# Patient Record
Sex: MALE | Race: WHITE | HISPANIC OR LATINO | Employment: UNEMPLOYED | URBAN - METROPOLITAN AREA
[De-identification: names, ages, dates, MRNs, and addresses within clinical notes are randomized per-mention and may not be internally consistent; named-entity substitution may affect disease eponyms.]

---

## 2022-07-21 ENCOUNTER — EVALUATION (OUTPATIENT)
Dept: PHYSICAL THERAPY | Facility: CLINIC | Age: 47
End: 2022-07-21
Payer: COMMERCIAL

## 2022-07-21 DIAGNOSIS — M54.50 ACUTE MIDLINE LOW BACK PAIN WITHOUT SCIATICA: Primary | ICD-10-CM

## 2022-07-21 PROCEDURE — 97110 THERAPEUTIC EXERCISES: CPT | Performed by: PHYSICAL THERAPIST

## 2022-07-21 PROCEDURE — 97162 PT EVAL MOD COMPLEX 30 MIN: CPT | Performed by: PHYSICAL THERAPIST

## 2022-07-21 NOTE — PROGRESS NOTES
PT Evaluation     Today's date: 2022  Patient name: Jc Vasquez  : 1975  MRN: [id number]  Referring provider: Thelma York  Dx:   Encounter Diagnosis     ICD-10-CM    1  Acute midline low back pain without sciatica  M54 50        Start Time: 903  Stop Time: 955  Total time in clinic (min): 52 minutes    Assessment  Assessment details: Patient is a 42 y/o male with chief complaints of lumbar pain that began following a fall down the stairs on 2022  Patient presents with decreased functional mobility due to increased pain, decreased core and LE strength, decreased lumbar ROM, gait deviations  No response to repeated extension ROM  Patient also has a history of left ankle ORIF and was referred for his left ankle, as well  However, recommend patient see ortho for his ankle prior to treatment  Xray reports and medication list unavailable  Requested patient to bring these items to treatment next visit  Patient will benefit from skilled physical therapy to address impairment and improve functional mobility of the lumbar spine  PT needed to allow for return to maximal function and improve quality of life  Impairments: abnormal or restricted ROM, activity intolerance, impaired physical strength, lacks appropriate home exercise program and pain with function  Understanding of Dx/Px/POC: good   Prognosis: good    Goals  STG within 4 weeks:   1  Patient to be independent in HEP  2  Reduce pain by 50% to improve quality of life  3  Improve lumbar ROM to no more than minimal impairment  4  Improve hip strength to 4+ in all planes  LTG within 8 weeks:   1  Patient to be independent in ADLs/IADLs without difficulty  2  Patient to be able to sit for 30 minutes with minimal low back pain  3  Patient to be able to ambulate community distances with minimal low back pain       Plan  Patient would benefit from: skilled physical therapy and PT eval  Planned modality interventions: cryotherapy, hydrotherapy and unattended electrical stimulation  Planned therapy interventions: therapeutic training, therapeutic exercise, therapeutic activities, stretching, strengthening, postural training, patient education, neuromuscular re-education, manual therapy, joint mobilization, IADL retraining, activity modification, ADL retraining, ADL training, body mechanics training, flexibility, functional ROM exercises, gait training, graded activity, graded exercise, graded motor, home exercise program, abdominal trunk stabilization and balance/weight bearing training  Frequency: 2x week  Duration in weeks: 8  Plan of Care beginning date: 2022  Plan of Care expiration date: 2022  Treatment plan discussed with: patient        Subjective Evaluation    History of Present Illness  Mechanism of injury: Patient is a 42 y/o male with chief complaints of low back pain  He states he had a fall down the stairs on 2022  He was brought to hospital by ambulance and states all imaging was negative for fracture  Prior to his fall he denies having back pain, but does report he was recovering from a calcaneous fracture and ORIF  Since his fall, his left ankle is bothering him worse  He also states since his April fall, his left knee and outside of his leg is bothering him more  He is referred for evaluation and treatment     Pain  Current pain ratin  At best pain ratin  At worst pain ratin  Location: low back   Quality: throbbing  Relieving factors: heat  Progression: no change    Social Support  Steps to enter house: no  Stairs in house: no   Lives in: [facility]    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[employer] )  Hand dominance: right  Exercise history: none       Diagnostic Tests  Abnormal x-ray: reports unavailable; requested patient bring copies of xray reports for lumbar spine and left ankle   Treatments  Treatments tried: left ankle surgery   Patient Goals  Patient goals for therapy: return to work, return to sport/leisure activities, independence with ADLs/IADLs and decreased pain          Objective     Concurrent Complaints  Negative for bladder dysfunction, bowel dysfunction and saddle (S4) numbness    Neurological Testing     Sensation     Lumbar   Left   Intact: light touch    Right   Intact: light touch    Reflexes   Left   Patellar (L4): normal (2+)  Achilles (S1): normal (2+)    Right   Patellar (L4): normal (2+)  Achilles (S1): normal (2+)    Active Range of Motion     Lumbar   Flexion:  with pain Restriction level: moderate  Extension:  with pain Restriction level: moderate  Left lateral flexion:  with pain Restriction level: moderate  Right lateral flexion:  with pain Restriction level: moderate  Mechanical Assessment    Cervical      Thoracic      Lumbar    Lying extension: repeated movements  Pain location: no change    Strength/Myotome Testing     Left Hip   Planes of Motion   Flexion: 3+  Extension: 3+    Right Hip   Planes of Motion   Flexion: 4+  Extension: 3+    Left Knee   Flexion: 3+  Extension: 3+    Right Knee   Flexion: 4+  Extension: 4+    Left Ankle/Foot   Dorsiflexion: 4-  Great toe extension: 4-    Right Ankle/Foot   Dorsiflexion: 4+  Great toe extension: 4+    General Comments:      Lumbar Comments  Pain with palpation of lumbar SPs; no radiating symptoms with palpation                Precautions: left ankle ORIF Sept 2021; fall down stairs April 2022     Increased time spent on patient education with diagnosis, prognosis, goals of therapy, progression of therapy, and plan of care  All questions answered  Patient instructed to call clinic with questions or concerns  Written HEP provided to patient  [employer]  Access Code: [email or web address]      Manuals 7/21                                                                 Neuro Re-Ed             TA engagement 3"x20             Adductor set  3"x20 Prone glute set  3"x20                                                                 Ther Ex             Pt Edu  KS                                                                                                        Ther Activity                                       Gait Training                                       Modalities

## 2022-07-21 NOTE — LETTER
2022    Leigh Sheth  [address]    Patient: Shayna Mart   YOB: 1975   Date of Visit: 2022     Encounter Diagnosis     ICD-10-CM    1  Acute midline low back pain without sciatica  M54 50        Dear Dr Michelle Currie Recipients: Thank you for your recent referral of Shayna Mart  Please review the attached evaluation summary from Yasin's recent visit  Please verify that you agree with the plan of care by signing the attached order  If you have any questions or concerns, please do not hesitate to call  I sincerely appreciate the opportunity to share in the care of one of your patients and hope to have another opportunity to work with you in the near future  Sincerely,    Krystina Morrison, PT      Referring Provider:      I certify that I have read the below Plan of Care and certify the need for these services furnished under this plan of treatment while under my care  Leigh Sheth  [address] [facility] [place] [address]  Via Fax: [phone]          PT Evaluation     Today's date: 2022  Patient name: Shayna Mart  : 1975  MRN: [id number]  Referring provider: Debbie Shay  Dx:   Encounter Diagnosis     ICD-10-CM    1  Acute midline low back pain without sciatica  M54 50        Start Time: 903  Stop Time: 955  Total time in clinic (min): 52 minutes    Assessment  Assessment details: Patient is a 42 y/o male with chief complaints of lumbar pain that began following a fall down the stairs on 2022  Patient presents with decreased functional mobility due to increased pain, decreased core and LE strength, decreased lumbar ROM, gait deviations  No response to repeated extension ROM  Patient also has a history of left ankle ORIF and was referred for his left ankle, as well  However, recommend patient see ortho for his ankle prior to treatment  Xray reports and medication list unavailable    Requested patient to bring these items to treatment next visit  Patient will benefit from skilled physical therapy to address impairment and improve functional mobility of the lumbar spine  PT needed to allow for return to maximal function and improve quality of life  Impairments: abnormal or restricted ROM, activity intolerance, impaired physical strength, lacks appropriate home exercise program and pain with function  Understanding of Dx/Px/POC: good   Prognosis: good    Goals  STG within 4 weeks:   1  Patient to be independent in HEP  2  Reduce pain by 50% to improve quality of life  3  Improve lumbar ROM to no more than minimal impairment  4  Improve hip strength to 4+ in all planes  LTG within 8 weeks:   1  Patient to be independent in ADLs/IADLs without difficulty  2  Patient to be able to sit for 30 minutes with minimal low back pain  3  Patient to be able to ambulate community distances with minimal low back pain  Plan  Patient would benefit from: skilled physical therapy and PT eval  Planned modality interventions: cryotherapy, hydrotherapy and unattended electrical stimulation  Planned therapy interventions: therapeutic training, therapeutic exercise, therapeutic activities, stretching, strengthening, postural training, patient education, neuromuscular re-education, manual therapy, joint mobilization, IADL retraining, activity modification, ADL retraining, ADL training, body mechanics training, flexibility, functional ROM exercises, gait training, graded activity, graded exercise, graded motor, home exercise program, abdominal trunk stabilization and balance/weight bearing training  Frequency: 2x week  Duration in weeks: 8  Plan of Care beginning date: 7/21/2022  Plan of Care expiration date: 9/22/2022  Treatment plan discussed with: patient        Subjective Evaluation    History of Present Illness  Mechanism of injury: Patient is a 42 y/o male with chief complaints of low back pain    He states he had a fall down the stairs on 2022  He was brought to hospital by ambulance and states all imaging was negative for fracture  Prior to his fall he denies having back pain, but does report he was recovering from a calcaneous fracture and ORIF  Since his fall, his left ankle is bothering him worse  He also states since his April fall, his left knee and outside of his leg is bothering him more  He is referred for evaluation and treatment     Pain  Current pain ratin  At best pain ratin  At worst pain ratin  Location: low back   Quality: throbbing  Relieving factors: heat  Progression: no change    Social Support  Steps to enter house: no  Stairs in house: no   L[facility] in: [facility]    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[employer] )  Hand dominance: right  Exercise history: none       Diagnostic Tests  Abnormal x-ray: reports unavailable; requested patient bring copies of xray reports for lumbar spine and left ankle   Treatments  Treatments tried: left ankle surgery   Patient Goals  Patient goals for therapy: return to work, return to [employer] activities, independence with ADLs/IADLs and decreased pain          Objective     Concurrent Complaints  Negative for bladder dysfunction, bowel dysfunction and saddle (S4) numbness    Neurological Testing     Sensation     Lumbar   Left   Intact: light touch    Right   Intact: light touch    Reflexes   Left   Patellar (L4): normal (2+)  Achilles (S1): normal (2+)    Right   Patellar (L4): normal (2+)  Achilles (S1): normal (2+)    Active Range of Motion     Lumbar   Flexion:  with pain Restriction level: moderate  Extension:  with pain Restriction level: moderate  Left lateral flexion:  with pain Restriction level: moderate  Right lateral flexion:  with pain Restriction level: moderate  Mechanical Assessment    Cervical      Thoracic      Lumbar    Lying extension: repeated movements  Pain location: no change    Strength/Myotome Testing     Left Hip   Planes of Motion   Flexion: 3+  Extension: 3+    Right Hip   Planes of Motion   Flexion: 4+  Extension: 3+    Left Knee   Flexion: 3+  Extension: 3+    Right Knee   Flexion: 4+  Extension: 4+    Left Ankle/Foot   Dorsiflexion: 4-  Great toe extension: 4-    Right Ankle/Foot   Dorsiflexion: 4+  Great toe extension: 4+    General Comments:      Lumbar Comments  Pain with palpation of lumbar SPs; no radiating symptoms with palpation                Precautions: left ankle ORIF Sept 2021; fall down stairs April 2022     Increased time spent on patient education with diagnosis, prognosis, goals of therapy, progression of therapy, and plan of care  All questions answered  Patient instructed to call clinic with questions or concerns  Written HEP provided to patient  [employer]  Access Code: [email or web address]      Manuals 7/21                                                                 Neuro Re-Ed             TA engagement 3"x20             Adductor set  3"x20             Prone glute set  3"x20                                                                 Ther Ex             Pt Edu  KS                                                                                                        Ther Activity                                       Gait Training                                       Modalities

## 2022-07-25 ENCOUNTER — OFFICE VISIT (OUTPATIENT)
Dept: PHYSICAL THERAPY | Facility: CLINIC | Age: 47
End: 2022-07-25
Payer: COMMERCIAL

## 2022-07-25 DIAGNOSIS — M54.50 ACUTE MIDLINE LOW BACK PAIN WITHOUT SCIATICA: Primary | ICD-10-CM

## 2022-07-25 PROCEDURE — 97140 MANUAL THERAPY 1/> REGIONS: CPT | Performed by: PHYSICAL THERAPIST

## 2022-07-25 PROCEDURE — 97110 THERAPEUTIC EXERCISES: CPT | Performed by: PHYSICAL THERAPIST

## 2022-07-25 PROCEDURE — 97014 ELECTRIC STIMULATION THERAPY: CPT | Performed by: PHYSICAL THERAPIST

## 2022-07-25 RX ORDER — LIDOCAINE 50 MG/G
1 PATCH TOPICAL DAILY
COMMUNITY
End: 2022-10-18

## 2022-07-25 RX ORDER — DICLOFENAC SODIUM 75 MG/1
TABLET, DELAYED RELEASE ORAL 2 TIMES DAILY
COMMUNITY
End: 2022-10-18

## 2022-07-25 RX ORDER — METHOCARBAMOL 750 MG/1
750 TABLET, FILM COATED ORAL EVERY 6 HOURS PRN
COMMUNITY
End: 2022-10-18

## 2022-07-25 NOTE — PROGRESS NOTES
Daily Note     Today's date: 2022  Patient name: Aleksandr Perez  : 1975  MRN: [id number]  Referring provider: Bridget Villanueva  Dx:   Encounter Diagnosis     ICD-10-CM    1  Acute midline low back pain without sciatica  M54 50                   Subjective: Patient reports 8/10 pain within lumbar region  Objective: See treatment diary below      Assessment: Tolerated treatment fair  Reduction of pain from 8 to 6 post session  Review of several core stab exercises  Xray report available on patient's phone from 22 shows:" minimal L5-S1 disc space narrowing  No fracture "  Patient would benefit from continued PT      Plan: Continue per plan of care  Precautions: left ankle ORIF 2021; fall down stairs 2022       [employer]  Access Code: [email or web address]      Manuals             IASTM- LSP   10'                                                   Neuro Re-Ed             TA engagement 3"x20  3"x20            Adductor set  3"x20  3"x20            Prone glute set  3"x20  3"x20           hooklying hip abd  Red x20                                                   Ther Ex             Pt Edu  KS  KS           Recumbent bike   5 min            Prone lying   10 min w heat                                                                             Ther Activity                                       Gait Training                                       Modalities             Hot pack- LSP  10'            TENS LSP   10'

## 2022-07-28 ENCOUNTER — OFFICE VISIT (OUTPATIENT)
Dept: PHYSICAL THERAPY | Facility: CLINIC | Age: 47
End: 2022-07-28
Payer: COMMERCIAL

## 2022-07-28 DIAGNOSIS — M54.50 ACUTE MIDLINE LOW BACK PAIN WITHOUT SCIATICA: Primary | ICD-10-CM

## 2022-07-28 PROCEDURE — 97014 ELECTRIC STIMULATION THERAPY: CPT | Performed by: PHYSICAL THERAPIST

## 2022-07-28 PROCEDURE — 97112 NEUROMUSCULAR REEDUCATION: CPT | Performed by: PHYSICAL THERAPIST

## 2022-07-28 PROCEDURE — 97110 THERAPEUTIC EXERCISES: CPT | Performed by: PHYSICAL THERAPIST

## 2022-07-28 PROCEDURE — 97140 MANUAL THERAPY 1/> REGIONS: CPT | Performed by: PHYSICAL THERAPIST

## 2022-07-28 NOTE — PROGRESS NOTES
Daily Note     Today's date: 2022  Patient name: Ranjana Esparza  : 1975  MRN: [id number]  Referring provider: Verner Beverage  Dx:   Encounter Diagnosis     ICD-10-CM    1  Acute midline low back pain without sciatica  M54 50                   Subjective: Patient states his back is still bothering him, without much relief  Objective: See treatment diary below      Assessment: Tolerated treatment well  No change in symptoms with prone press up or bilateral side glides  However, reduction in symptoms with prone lumbar extension mobs  Will re-assess next visit  Patient would benefit from continued PT      Plan: Continue per plan of care  Precautions: left ankle ORIF 2021; fall down stairs 2022       [employer]  Access Code: [email or web address]      Manuals            IASTM- LSP   10'            Prone lumbar extension mobs    10'                                     Neuro Re-Ed             TA engagement 3"x20  3"x20  3"x20           TA w DLS PB push    8"R92           Adductor set  3"x20  3"x20            Prone glute set  3"x20  3"x20           hooklying hip abd  Red x20  Red x20           Paloff press    NV                                     Ther Ex             Pt Edu  KS  KS KS          Recumbent bike   5 min  7 min           Prone lying   10 min w heat            Repeated motion re-assessment    10 min                                                               Ther Activity                                       Gait Training                                       Modalities             Hot pack- LSP  10'  10'           TENS LSP   10' 10'

## 2022-08-01 ENCOUNTER — OFFICE VISIT (OUTPATIENT)
Dept: PHYSICAL THERAPY | Facility: CLINIC | Age: 47
End: 2022-08-01
Payer: COMMERCIAL

## 2022-08-01 DIAGNOSIS — M54.50 ACUTE MIDLINE LOW BACK PAIN WITHOUT SCIATICA: Primary | ICD-10-CM

## 2022-08-01 PROCEDURE — 97140 MANUAL THERAPY 1/> REGIONS: CPT | Performed by: PHYSICAL THERAPIST

## 2022-08-01 PROCEDURE — 97112 NEUROMUSCULAR REEDUCATION: CPT | Performed by: PHYSICAL THERAPIST

## 2022-08-01 NOTE — PROGRESS NOTES
Daily Note     Today's date: 2022  Patient name: Clement Matta  : 1975  MRN: [id number]  Referring provider: Andrea Frost  Dx:   Encounter Diagnosis     ICD-10-CM    1  Acute midline low back pain without sciatica  M54 50                   Subjective: Patient reports 710 pain  States it was a little better following last visit, but the relief didn't last         Objective: See treatment diary below      Assessment: Tolerated treatment well  Reduction in pain from start to finish of session to a 6/10 pain  Time constraint due to patient having to leave early; will resume exercises at next visit  Patient would benefit from continued PT      Plan: Continue per plan of care  Precautions: left ankle ORIF 2021; fall down stairs 2022       [employer]  Access Code: [email or web address]      Manuals           IASTM- LSP   10'            Prone lumbar extension mobs    10'  10'                                   Neuro Re-Ed             TA engagement 3"x20  3"x20  3"x20           TA w DLS PB push    7"M94           Adductor set  3"x20  3"x20            Prone glute set  3"x20  3"x20           hooklying hip abd  Red x20  Red x20           Paloff press    NV  Red x 20 ea          Prone hip extension     2x10                       Ther Ex             Pt Edu  KS  KS KS KS          Recumbent bike   5 min  7 min  7 min         Prone lying   10 min w heat            Repeated motion re-assessment    10 min                                                               Ther Activity                                       Gait Training                                       Modalities             Hot pack- LSP  10'  10'           TENS LSP   10' 10'

## 2022-08-04 ENCOUNTER — OFFICE VISIT (OUTPATIENT)
Dept: PHYSICAL THERAPY | Facility: CLINIC | Age: 47
End: 2022-08-04
Payer: COMMERCIAL

## 2022-08-04 DIAGNOSIS — M54.50 ACUTE MIDLINE LOW BACK PAIN WITHOUT SCIATICA: Primary | ICD-10-CM

## 2022-08-04 PROCEDURE — 97110 THERAPEUTIC EXERCISES: CPT | Performed by: PHYSICAL THERAPIST

## 2022-08-04 PROCEDURE — 97140 MANUAL THERAPY 1/> REGIONS: CPT | Performed by: PHYSICAL THERAPIST

## 2022-08-04 PROCEDURE — 97014 ELECTRIC STIMULATION THERAPY: CPT | Performed by: PHYSICAL THERAPIST

## 2022-08-04 NOTE — PROGRESS NOTES
Daily Note     Today's date: 2022  Patient name: Enoch Izquierdo  : 1975  MRN: [id number]  Referring provider: Odalis Means  Dx:   Encounter Diagnosis     ICD-10-CM    1  Acute midline low back pain without sciatica  M54 50                   Subjective: Patient reports 7/10 pain  States the relief from therapy only lasts 45 minutes after therapy, partially due to sitting in the car and having to drive  Objective: See treatment diary below      Assessment: Tolerated treatment well  No reduction in symptoms with prone press up  However reduction noted with lumbar   He reports a 4/10 pain post session; continued good response to heat/stim  Addition of prone quad/hip flexor stretch  Patient would benefit from continued PT      Plan: Continue per plan of care  Precautions: left ankle ORIF 2021; fall down stairs 2022       [employer]  Access Code: [email or web address]      Manuals         IASTM- LSP   10'            Prone lumbar extension mobs    10'  10' 10'                                   Neuro Re-Ed             TA engagement 3"x20  3"x20  3"x20           TA w DLS PB push    0"R66           Adductor set  3"x20  3"x20            Prone glute set  3"x20  3"x20           hooklying hip abd  Red x20  Red x20           Paloff press    NV  Red x 20 ea  Red x20 ea         Prone hip extension     2x10                       Ther Ex             Pt Edu  KS  KS KS KS  KS        Recumbent bike   5 min  7 min  7 min 10 min        Prone lying   10 min w heat            Repeated motion re-assessment    10 min   Prone press up 2x10         Standing hip abd/ext     Red 2x10 ea         Prone quad/hip flexor stretch     5x20" ea                                  Ther Activity                                       Gait Training                                       Modalities             Hot pack- LSP  10'  10'   10'        TENS LSP   10' 10'   10'

## 2022-08-08 ENCOUNTER — APPOINTMENT (OUTPATIENT)
Dept: PHYSICAL THERAPY | Facility: CLINIC | Age: 47
End: 2022-08-08

## 2022-08-11 ENCOUNTER — OFFICE VISIT (OUTPATIENT)
Dept: PHYSICAL THERAPY | Facility: CLINIC | Age: 47
End: 2022-08-11
Payer: COMMERCIAL

## 2022-08-11 DIAGNOSIS — M54.50 ACUTE MIDLINE LOW BACK PAIN WITHOUT SCIATICA: Primary | ICD-10-CM

## 2022-08-11 PROCEDURE — 97014 ELECTRIC STIMULATION THERAPY: CPT | Performed by: PHYSICAL THERAPIST

## 2022-08-11 PROCEDURE — 97110 THERAPEUTIC EXERCISES: CPT | Performed by: PHYSICAL THERAPIST

## 2022-08-11 NOTE — PROGRESS NOTES
Daily Note     Today's date: 2022  Patient name: Aleksandr Perez  : 1975  MRN: [id number]  Referring provider: Bridget Villanueva  Dx:   Encounter Diagnosis     ICD-10-CM    1  Acute midline low back pain without sciatica  M54 50                   Subjective: Patient states his back pain is still persistent  He reports 7/10 pain  States he had to reschedule his appointment for his foot/ankle for 22  Objective: See treatment diary below      Assessment: Tolerated treatment fair  At this time, due to persistent pain, recommend he reach out to PCP/referring physician to get a referral to pain management  Plan to go ON HOLD until he sees pain management or physician for additional treatment  Patient instructed to continue with HEP and call clinic with any questions or concerns  Plan: Patient on hold until he sees pain management or physician for additional treatment/referral       Precautions: left ankle ORIF 2021; fall down stairs 2022       [employer]  Access Code: [email or web address]      Manuals        IASTM- LSP   10'            Prone lumbar extension mobs    10'  10' 10'  decline                                 Neuro Re-Ed             TA engagement 3"x20  3"x20  3"x20           TA w DLS PB push    8"O10           Adductor set  3"x20  3"x20            Prone glute set  3"x20  3"x20           hooklying hip abd  Red x20  Red x20           Paloff press    NV  Red x 20 ea  Red x20 ea  Green x 20 ea        Prone hip extension     2x10                       Ther Ex             Pt Edu  KS  KS KS KS  KS KS       Recumbent bike   5 min  7 min  7 min 10 min 8 min       Prone lying   10 min w heat            Repeated motion re-assessment    10 min   Prone press up 2x10         Standing hip abd/ext     Red 2x10 ea  Red 2x10 ea       Prone quad/hip flexor stretch     5x20" ea        Seated lumbar flexion w PB- L, C, R      5"x5 ea Ther Activity                                       Gait Training                                       Modalities             Hot pack- LSP  10'  10'   10' 10'       TENS LSP   10' 10'   10'  10'

## 2022-08-15 ENCOUNTER — APPOINTMENT (OUTPATIENT)
Dept: PHYSICAL THERAPY | Facility: CLINIC | Age: 47
End: 2022-08-15

## 2022-08-18 ENCOUNTER — APPOINTMENT (OUTPATIENT)
Dept: PHYSICAL THERAPY | Facility: CLINIC | Age: 47
End: 2022-08-18

## 2022-08-22 ENCOUNTER — APPOINTMENT (OUTPATIENT)
Dept: PHYSICAL THERAPY | Facility: CLINIC | Age: 47
End: 2022-08-22

## 2022-08-23 LAB — COLOGUARD RESULT REPORTABLE: NEGATIVE

## 2022-08-24 ENCOUNTER — APPOINTMENT (OUTPATIENT)
Dept: RADIOLOGY | Facility: AMBULARY SURGERY CENTER | Age: 47
End: 2022-08-24
Attending: ORTHOPAEDIC SURGERY
Payer: COMMERCIAL

## 2022-08-24 VITALS
DIASTOLIC BLOOD PRESSURE: 84 MMHG | WEIGHT: 240 LBS | SYSTOLIC BLOOD PRESSURE: 147 MMHG | BODY MASS INDEX: 35.55 KG/M2 | HEART RATE: 76 BPM | HEIGHT: 69 IN

## 2022-08-24 DIAGNOSIS — M25.572 LEFT ANKLE PAIN, UNSPECIFIED CHRONICITY: ICD-10-CM

## 2022-08-24 DIAGNOSIS — M19.072 ARTHRITIS OF LEFT SUBTALAR JOINT: Primary | ICD-10-CM

## 2022-08-24 PROCEDURE — 99243 OFF/OP CNSLTJ NEW/EST LOW 30: CPT | Performed by: ORTHOPAEDIC SURGERY

## 2022-08-24 PROCEDURE — 20600 DRAIN/INJ JOINT/BURSA W/O US: CPT | Performed by: ORTHOPAEDIC SURGERY

## 2022-08-24 PROCEDURE — 73610 X-RAY EXAM OF ANKLE: CPT

## 2022-08-24 RX ORDER — OLANZAPINE 2.5 MG/1
2.5 TABLET ORAL
COMMUNITY
Start: 2022-06-13

## 2022-08-24 RX ORDER — BUPRENORPHINE AND NALOXONE 2; .5 MG/1; MG/1
FILM, SOLUBLE BUCCAL; SUBLINGUAL
COMMUNITY
Start: 2022-06-14 | End: 2022-10-18

## 2022-08-24 RX ORDER — TRAZODONE HYDROCHLORIDE 100 MG/1
TABLET ORAL
COMMUNITY
Start: 2022-06-25

## 2022-08-24 RX ORDER — POLYETHYLENE GLYCOL 3350 17 G/17G
POWDER, FOR SOLUTION ORAL
COMMUNITY
Start: 2022-05-10

## 2022-08-24 RX ORDER — METHOCARBAMOL 500 MG/1
TABLET, FILM COATED ORAL
COMMUNITY
Start: 2022-05-17 | End: 2022-10-18

## 2022-08-24 RX ORDER — CELECOXIB 200 MG/1
200 CAPSULE ORAL DAILY
COMMUNITY
Start: 2022-05-10 | End: 2022-10-18

## 2022-08-24 RX ORDER — TRIAMCINOLONE ACETONIDE 40 MG/ML
40 INJECTION, SUSPENSION INTRA-ARTICULAR; INTRAMUSCULAR
Status: COMPLETED | OUTPATIENT
Start: 2022-08-24 | End: 2022-08-24

## 2022-08-24 RX ORDER — MELOXICAM 15 MG/1
15 TABLET ORAL DAILY
Qty: 30 TABLET | Refills: 1 | Status: SHIPPED | OUTPATIENT
Start: 2022-08-24

## 2022-08-24 RX ADMIN — TRIAMCINOLONE ACETONIDE 40 MG: 40 INJECTION, SUSPENSION INTRA-ARTICULAR; INTRAMUSCULAR at 16:09

## 2022-08-24 NOTE — PROGRESS NOTES
BILLY Barrientos  Attending, Orthopaedic Surgery  Foot and [facility] [address] [facility]      ORTHOPAEDIC FOOT AND ANKLE CLINIC VISIT     Assessment:     Encounter Diagnoses   Name Primary?  Left ankle pain, unspecified chronicity     Arthritis of left subtalar joint Yes            Plan:   · The patient verbalized understanding of exam findings and treatment plan  We engaged in the shared decision-making process and treatment options were discussed at length with the patient  Surgical and conservative management discussed today along with risks and benefits  · Greg Monday has subtalar arthritis secondary to his calcaneus fracture  · We discussed injection today and we completed it  Consented and understood risks/benefits  · Discussed surgical options including removal of hardware, subtalar fusion  He will let us know when he is ready to have surgery  · Return if symptoms worsen or fail to improve  Small joint arthrocentesis: L subtalar  Universal Protocol:  Consent: Verbal consent obtained  Risks and benefits: risks, benefits and alternatives were discussed  Consent given by: patient  Patient identity confirmed: verbally with patient    Supporting Documentation  Indications: pain   Procedure Details  Location: foot - L subtalar  Needle size: 22 G  Ultrasound guidance: no  Approach: lateral  Medications administered: 40 mg triamcinolone acetonide 40 mg/mL    Patient tolerance: patient tolerated the procedure well with no immediate complications          History of Present Illness:   Chief Complaint:   Chief Complaint   Patient presents with    Left Ankle - Pain     Shayna Mart is a 41 y o  male who is being seen for left ankle pain  He sustained a calcaneus fracture in 09/2021 and received an ORIF to fix it  Since the surgery he has complained of pain and stiffness  He was seen by podiatry in 12/2020 who discussed  a possible subtalar fusion and removal of hardware   His pain is localized at to his subtalar region with minimal radiating and described as sharp and severe  Patient denies numbness, tingling or radicular pain  Denies history of neuropathy  Patient does not smoke, does not have diabetes and does not take blood thinners  Patient denies family history of anesthesia complications and has not had any complications with anesthesia  Pain/symptom timing:  Worse during the day when active  Pain/symptom context:  Worse with activites and work  Pain/symptom modifying factors:  Rest makes better, activities make worse  Pain/symptom associated signs/symptoms: none    Prior treatment   · NSAIDsYes   · Injections Yes   · Bracing/Orthotics No    · Physical Therapy Yes         Orthopedic Surgical History:   See below    Past Medical, Surgical and Social History:  Past Medical History:  has no past medical history on file  Problem List: does not have a problem list on file  Past Surgical History:  has a past surgical history that includes Ankle fracture surgery (Left, 09/27/2021)  Family History: family history is not on file  Social History:  reports that he has quit smoking  He has never used smokeless tobacco  He reports current alcohol use  He reports that he does not use drugs  Current Medications: has a current medication list which includes the following prescription(s): diclofenac, meloxicam, buprenorphine-naloxone, celecoxib, lidocaine, methocarbamol, methocarbamol, olanzapine, polyethylene glycol, and trazodone  Allergies: has No Known Allergies       Review of Systems:  General- denies fever/chills  HEENT- denies hearing loss or sore throat  Eyes- denies eye pain or visual disturbances, denies red eyes  Respiratory- denies cough or SOB  Cardio- denies chest pain or palpitations  GI- denies abdominal pain  Endocrine- denies urinary frequency  Urinary- denies pain with urination  Musculoskeletal- Negative except noted above  Skin- denies rashes or wounds  Neurological- denies dizziness or headache  Psychiatric- denies anxiety or difficulty concentrating    Physical Exam:   /84 (BP Location: Right arm, Patient Position: Sitting, Cuff Size: Adult)   Pulse 76   Ht 5' 9" (1 753 m)   Wt 109 kg (240 lb)   BMI 35 44 kg/m²   General/Constitutional: No apparent distress: well-nourished and well developed  Eyes: normal ocular motion  Cardio: RRR, Normal S1S2, No m/r/g  Lymphatic: No appreciable lymphadenopathy  Respiratory: Non-labored breathing, CTA b/l no w/c/r  Vascular: No edema, swelling or tenderness, except as noted in detailed exam   Integumentary: No impressive skin lesions present, except as noted in detailed exam   Neuro: No ataxia or tremors noted  Psych: Normal mood and affect, oriented to person, place and time  Appropriate affect  Musculoskeletal: Normal, except as noted in detailed exam and in HPI  Examination    Left    Gait Normal and Antalgic   Musculoskeletal Tender to palpation at subtalar region    Skin Normal       Nails Normal    Range of Motion  10 degrees dorsiflexion, 30 degrees plantarflexion  Subtalar motion: limited & painful    Stability Stable    Muscle Strength 5/5 tibialis anterior  5/5 gastrocnemius-soleus  5/5 posterior tibialis  5/5 peroneal/eversion strength  5/5 EHL  5/5 FHL    Neurologic Normal    Sensation Intact to light touch throughout sural, saphenous, superficial peroneal, deep peroneal and medial/lateral plantar nerve distributions  [place] 5 07 filament (10g) testing deferred  Cardiovascular Brisk capillary refill < 2 seconds,intact DP and PT pulses    Special Tests None      Imaging Studies:   3 views of the ankle were taken, reviewed and interpreted independently that demonstrate Subtalar arthritis with s/p ORIF of left calcaneous  Reviewed by me personally      Scribe Attestation    I,:  Aditi Leo PA-C am acting as a scribe while in the presence of the attending physician :       I,:  Rox Padron MD personally performed the services described in this documentation    as scribed in my presence :             Angelina Cinnamon Lachman, MD  Foot & Ankle Surgery   Department of [address] [place] [address]      I personally performed the service  Angelina Cinnamon Lachman, MD

## 2022-08-25 ENCOUNTER — APPOINTMENT (OUTPATIENT)
Dept: PHYSICAL THERAPY | Facility: CLINIC | Age: 47
End: 2022-08-25

## 2022-08-25 NOTE — PROGRESS NOTES
Assessment:  1  Chronic bilateral low back pain with left-sided sciatica    2  Lumbar paraspinal muscle spasm    3  Strain of lumbar region, subsequent encounter        Plan:  Orders Placed This Encounter   Procedures    MRI lumbar spine without contrast     Standing Status:   Future     Standing Expiration Date:   8/29/2026     Scheduling Instructions: There is no preparation for this test  Please leave your jewelry and valuables at home, wedding rings are the exception  All patients will be required to change into a hospital gown and pants  Street clothes are not permitted in the MRI  Magnetic nail polish must be removed prior to arrival for your test  Please bring your insurance cards, a form of photo ID and a list of your medications with you  Arrive 15 minutes prior to your appointment time in order to register  Please bring any prior CT or MRI studies of this area that were not performed at a [facility]  To schedule this appointment, please contact Central Scheduling at [phone]  Prior to your appointment, please make sure you complete the MRI Screening Form when you e-Check in for your appointment  This will be available starting 7 days before your appointment in [address]  You may receive an e-mail with an activation code if you do not have a [employer] account  If you do not have access to a device, we will complete your screening at your appointment  Order Specific Question:   What is the patient's sedation requirement? Answer:   No Sedation     Order Specific Question:   Release to patient through [employer]     Answer:   Immediate     Order Specific Question:   Is order priority selected as STAT? Answer:   No     Order Specific Question:   Reason for Exam (FREE TEXT)     Answer:   Persistent low back and left lower extremity  PT held due to lack of improvement         New Medications Ordered This Visit   Medications    methocarbamol (ROBAXIN) 500 mg tablet Sig: TAKE 2 TABLETS BY MOUTH 3 TIMES A DAY FOR 14 DAYS    Naproxen Sodium 220 MG CAPS     Sig: Take 2 tablets by mouth    Diclofenac Sodium (VOLTAREN) 1 %     Sig: APPLY 2 GRAMS TO AREA OF PAIN 4 TIMES PER DAY AS NEEDED FOR PAIN    traZODone (DESYREL) 100 mg tablet     Sig: Take 100 mg by mouth daily       My impressions and treatment recommendations were discussed in detail with the patient, who verbalized understanding and had no further questions  This is a 51-year-old male who presents to our office with chief complaint of bilateral low back pain, left greater than right with radiation down the left lower extremity, down the posterior thigh, past the knee into the posterolateral calf  Issues began after fall in April 2022 with little relief from multiple over-the-counter analgesics including NSAIDs, muscle relaxers, oral and buccal opioids  He has also undergone multiple visits with physical therapy and treatment was halted due to lack of progression and worsening of symptoms  Differential diagnosis includes lumbar radiculopathy     Given lack of response to conservative therapies, will order MRI lumbar spine to assess for any significant compressive pathology  Discussed that he may be candidate for epidural steroid injection should imaging reveal significant compressive pathology      [facility] Prescription Drug Monitoring Program report was reviewed and was appropriate     Complete risks and benefits including bleeding, infection, tissue reaction, nerve injury and allergic reaction were discussed  The approach was demonstrated using models and literature was provided  Verbal and written consent was obtained  Discharge instructions were provided  I personally saw and examined the patient and I agree with the above discussed plan of care      History of Present Illness:    Dewayne Oliver is a 41 y o  male who presents to [facility] for initial evaluation of the above stated pain complaints  The patient has a past medical and chronic pain history as outlined in the assessment section  He was referred by Thais Connolly PT  No address on file   Patient is here with chief complaint of low back pain with radiation down the left lower extremity  This is a chronic issue for last 5 months  Stems from an injury on 04/09/2022 after falling down stairs  Moderate to severe intensity over the past month  8/10  Nearly constant  During the past month pain has been worse in the morning and evening  Described as shooting, numbness, pins and needles  Reports weakness in lower extremities  Does not ambulate with an assistive device  Increased with lying down, standing, bending, sitting  X-ray lumbar spine shows straightening of normal lordosis  Past medical history includes anxiety, depression, arthritis of the foot  Will be undergoing ankle fusion with orthopedic foot surgery  Next   No relief with PT  Moderate relief with heat/ice therapy  No tobacco marijuana use  Does not drink alcohol  Not on blood thinners  Not allergic to latex or contrast dye  In the past been on multiple opioids including MS Contin, Percocet, tramadol, and Butrans patch  PDMP shows multiple intermittent opioid prescriptions over the last 8 months  Muscle necessary prescribed Butrans patch in June 2022 by his primary care provider  Currently using Voltaren gel, acetaminophen, aspirin, ibuprofen, meloxicam   Reports no significant relief  In the past has tried Celebrex, Lidoderm, Relafen, naproxen, Toradol, Robaxin, gabapentin  Also has tried Zoloft, Valium, Ativan  Review of Systems:    Review of Systems   Constitutional: Negative for fever and unexpected weight change  HENT: Negative for trouble swallowing  Eyes: Negative for visual disturbance  Respiratory: Negative for shortness of breath and wheezing  Cardiovascular: Negative for chest pain and palpitations  Gastrointestinal: Negative for constipation, diarrhea, nausea and vomiting  Endocrine: Positive for polydipsia  Negative for cold intolerance and heat intolerance  Genitourinary: Negative for difficulty urinating and frequency  Musculoskeletal: Positive for arthralgias, joint swelling and myalgias  Negative for gait problem  Skin: Negative for rash  Neurological: Positive for numbness  Negative for dizziness, seizures, syncope, weakness and headaches  Hematological: Does not bruise/bleed easily  Psychiatric/Behavioral: Positive for decreased concentration  Negative for dysphoric mood  Anxiety  Depression    All other systems reviewed and are negative  Patient Active Problem List   Diagnosis    Arthritis of left subtalar joint       History reviewed  No pertinent past medical history  Past Surgical History:   Procedure Laterality Date    ANKLE FRACTURE SURGERY Left 09/27/2021       History reviewed  No pertinent family history      Social History     Occupational History    Not on file   Tobacco Use    Smoking status: Former Smoker    Smokeless tobacco: Never Used   Vaping Use    Vaping Use: Never used   Substance and Sexual Activity    Alcohol use: Yes     Comment: occassional    Drug use: Never    Sexual activity: Not on file         Current Outpatient Medications:     diclofenac (VOLTAREN) 75 mg EC tablet, Take by mouth 2 (two) times a day, Disp: , Rfl:     Diclofenac Sodium (VOLTAREN) 1 %, APPLY 2 GRAMS TO AREA OF PAIN 4 TIMES PER DAY AS NEEDED FOR PAIN, Disp: , Rfl:     meloxicam (Mobic) 15 mg tablet, Take 1 tablet (15 mg total) by mouth daily, Disp: 30 tablet, Rfl: 1    methocarbamol (ROBAXIN) 500 mg tablet, TAKE 2 TABLETS BY MOUTH 3 TIMES A DAY FOR 14 DAYS, Disp: , Rfl:     Naproxen Sodium 220 MG CAPS, Take 2 tablets by mouth, Disp: , Rfl:     traZODone (DESYREL) 100 mg tablet, Take 100 mg by mouth daily, Disp: , Rfl:     buprenorphine-naloxone (Suboxone) 2-0 5 mg, PLACE UNDER THE TONGUE 1/2 FILM IN THE MORNING FOR 14 DAYS  FOR PAIN  (Patient not taking: No sig reported), Disp: , Rfl:     celecoxib (CeleBREX) 200 mg capsule, Take 200 mg by mouth daily (Patient not taking: No sig reported), Disp: , Rfl:     lidocaine (LIDODERM) 5 %, Apply 1 patch topically daily Remove & Discard patch within 12 hours or as directed by MD (Patient not taking: No sig reported), Disp: , Rfl:     methocarbamol (ROBAXIN) 500 mg tablet, TAKE 2 TABLETS BY MOUTH 3 TIMES A DAY FOR 14 DAYS (Patient not taking: No sig reported), Disp: , Rfl:     methocarbamol (ROBAXIN) 750 mg tablet, Take 750 mg by mouth every 6 (six) hours as needed for muscle spasms (Patient not taking: No sig reported), Disp: , Rfl:     OLANZapine (ZyPREXA) 2 5 mg tablet, Take 2 5 mg by mouth daily at bedtime (Patient not taking: No sig reported), Disp: , Rfl:     polyethylene glycol (GLYCOLAX) 17 GM/SCOOP powder, Mix 1 packet with 6 oz of water or juice twice weekly for constipation (Patient not taking: No sig reported), Disp: , Rfl:     traZODone (DESYREL) 100 mg tablet, TAKE BY MOUTH 1 TABLET BEFORE BEDTIME  FOR SLEEP  (Patient not taking: No sig reported), Disp: , Rfl:     Allergies   Allergen Reactions    Tramadol Nausea Only       Physical Exam:    /86 (BP Location: Left arm, Patient Position: Sitting, Cuff Size: Standard)   Pulse 85   Ht 5' 9" (1 753 m)   Wt 106 kg (232 lb 12 8 oz)   BMI 34 38 kg/m²     Constitutional: normal, well developed, well nourished, alert, in no distress and non-toxic and no overt pain behavior    Eyes: anicteric  HEENT: grossly intact  Neck: supple, symmetric, trachea midline and no masses   Pulmonary:even and unlabored  Cardiovascular:No edema or pitting edema present  Skin:Normal without rashes or lesions and well hydrated  Psychiatric:Mood and affect appropriate  Neurologic:Cranial Nerves II-XII grossly intact  Musculoskeletal:normal     Lumbar Spine Exam    Appearance:  Reversal of lordosis  Palpation/Tenderness:  left lumbar paraspinal tenderness  right lumbar paraspinal tenderness  left sacroiliac joint tenderness  right sacroiliac joint tenderness  Sensory:  no sensory deficits noted  Range of Motion:  Limited in all planes secondary to pain  Motor Strength:  Left hip flexion:  4/5  Left hip extension:  4/5  Right hip flexion:  5/5  Right hip extension:  5/5  Left knee flexion:  4/5  Left knee extension:  4/5  Right knee flexion:  5/5  Right knee extension:  5/5  Left foot dorsiflexion:  4/5  Left foot plantar flexion:  4/5  Right foot dorsiflexion:  5/5  Right foot plantar flexion:  5/5   Poor patient effort secondary to pain  Reflexes:  Left Patellar:  2+   Right Patellar:  2+   Left Achilles:  2+   Right Achilles:  2+   Special Tests:  Left Straight Leg Test:  negative  Right Straight Leg Test:  negative  Left Tk's Maneuver:  positive  Right Tk's Maneuver:  positive      Imaging    5/10/2022  Vertebra -- Digital Radiography   Lspine -- --       Impression    IMPRESSION   Minimal L5-S1 disc space narrowing   No evidence of fracture  Narrative    EXAM   XR L SPINE AP AND LATERAL-5/10/2022 3:21 pm     HISTORY   midline pain and numbness, fall 2 weeks     COMPARISON   None  TECHNIQUE   Three radiographs of the lumbar spine  FINDINGS   There are 5 non-rib-bearing lumbar type vertebral bodies   Straightening of the lumbar spine   No evidence of fracture or spondylolisthesis   Minimal disc space narrowing at L5-S1  Presumed cholecystectomy clips are seen  Procedure Note    Alvaro Matute MD - 05/25/2022   Formatting of this note might be different from the original    EXAM   XR L SPINE AP AND LATERAL-5/10/2022 3:21 pm     HISTORY   midline pain and numbness, fall 2 weeks     COMPARISON   None  TECHNIQUE   Three radiographs of the lumbar spine  FINDINGS   There are 5 non-rib-bearing lumbar type vertebral bodies   Straightening of the lumbar spine   No evidence of fracture or spondylolisthesis   Minimal disc space narrowing at L5-S1  Presumed cholecystectomy clips are seen       MRI lumbar spine without contrast    (Results Pending)       Orders Placed This Encounter   Procedures    MRI lumbar spine without contrast

## 2022-08-29 ENCOUNTER — APPOINTMENT (OUTPATIENT)
Dept: PHYSICAL THERAPY | Facility: CLINIC | Age: 47
End: 2022-08-29

## 2022-08-29 ENCOUNTER — OFFICE VISIT (OUTPATIENT)
Dept: PAIN MEDICINE | Facility: CLINIC | Age: 47
End: 2022-08-29
Payer: COMMERCIAL

## 2022-08-29 VITALS
WEIGHT: 232.8 LBS | BODY MASS INDEX: 34.48 KG/M2 | SYSTOLIC BLOOD PRESSURE: 126 MMHG | HEIGHT: 69 IN | HEART RATE: 85 BPM | DIASTOLIC BLOOD PRESSURE: 86 MMHG

## 2022-08-29 DIAGNOSIS — M54.42 CHRONIC BILATERAL LOW BACK PAIN WITH LEFT-SIDED SCIATICA: Primary | ICD-10-CM

## 2022-08-29 DIAGNOSIS — M62.830 LUMBAR PARASPINAL MUSCLE SPASM: ICD-10-CM

## 2022-08-29 DIAGNOSIS — G89.29 CHRONIC BILATERAL LOW BACK PAIN WITH LEFT-SIDED SCIATICA: Primary | ICD-10-CM

## 2022-08-29 DIAGNOSIS — S39.012D STRAIN OF LUMBAR REGION, SUBSEQUENT ENCOUNTER: ICD-10-CM

## 2022-08-29 PROCEDURE — 99244 OFF/OP CNSLTJ NEW/EST MOD 40: CPT | Performed by: STUDENT IN AN ORGANIZED HEALTH CARE EDUCATION/TRAINING PROGRAM

## 2022-08-29 RX ORDER — COVID-19 ANTIGEN TEST
2 KIT MISCELLANEOUS
COMMUNITY

## 2022-08-29 RX ORDER — TRAZODONE HYDROCHLORIDE 100 MG/1
100 TABLET ORAL DAILY
COMMUNITY
Start: 2022-05-24

## 2022-08-29 RX ORDER — METHOCARBAMOL 500 MG/1
TABLET, FILM COATED ORAL
COMMUNITY
Start: 2022-05-17 | End: 2022-10-18

## 2022-08-29 NOTE — PATIENT INSTRUCTIONS
Epidural Steroid Injection   AMBULATORY CARE:   What you need to know about an epidural steroid injection (ALISA):  An ALISA is a procedure to inject steroid medicine into the epidural space  The epidural space is between your spinal cord and vertebrae  Steroids reduce inflammation and fluid buildup in your spine that may be causing pain  You may be given pain medicine along with the steroids  How to prepare for an ALISA:  Your healthcare provider will talk to you about how to prepare for your procedure  He or she will tell you what medicines to take or not take on the day of your procedure  You may need to stop taking blood thinners or other medicines several days before your procedure  You may need to adjust any diabetes medicine you take on the day of your procedure  Steroid medicine can increase your blood sugar level  Arrange for someone to drive you home when you are discharged  What will happen during an ALISA:   You will be given medicine to numb the procedure area  You will be awake for the procedure, but you will not feel pain  You may also be given medicine to help you relax  Contrast liquid will be used to help your healthcare provider see the area better  Tell the healthcare provider if you have ever had an allergic reaction to contrast liquid  Your healthcare provider may place the needle into your neck area, middle of your back, or tailbone area  He may inject the medicine next to the nerves that are causing your pain  He may instead inject the medicine into a larger area of the epidural space  This helps the medicine spread to more nerves  Your healthcare provider will use a fluoroscope to help guide the needle to the right place  A fluoroscope is a type of x-ray  After the procedure, a bandage will be placed over the injection site to prevent infection  What will happen after an ALISA:  You will have a bandage over the injection site to prevent infection   Your healthcare provider will tell you when you can bathe and any activity guidelines  You will be able to go home  Risks of an ALISA:  You may have temporary or permanent nerve damage or paralysis  You may have bleeding or develop a serious infection, such as meningitis (swelling of the brain coverings)  An abscess may also develop  An abscess is a pus-filled area under the skin  You may need surgery to fix the abscess  You may have a seizure, anxiety, or trouble sleeping  If you are a man, you may have temporary erectile dysfunction (not able to have an erection)  Call your local emergency number (911 in the [address] [place] [address],[address]) if:   You have a seizure  You have trouble moving your legs  Seek care immediately if:   Blood soaks through your bandage  You have a fever or chills, severe back pain, and the procedure area is sensitive to the touch  You cannot control when you urinate or have a bowel movement  Call your doctor if:   You have weakness or numbness in your legs  Your wound is red, swollen, or draining pus  You have nausea or are vomiting  Your face or neck is red and you feel warm  You have more pain than you had before the procedure  You have swelling in your hands or feet  You have questions or concerns about your condition or care  Care for your wound as directed: You may remove the bandage before you go to bed the day of your procedure  You may take a shower, but do not take a bath for at least 24 hours  Self-care:   Do not drive,  use machines, or do strenuous activity for 24 hours after your procedure or as directed  Continue other treatments  as directed  Steroid injections alone will not control your pain  The injections are meant to be used with other treatments, such as physical therapy  Follow up with your doctor as directed:  Write down your questions so you remember to ask them during your visits     © Copyright [employer] 2022 Information is for End User's use only and may not be sold, redistributed or otherwise used for commercial purposes  All illustrations and images included in CareNotes® are the copyrighted property of A D A M [employer] Inc  or Carlita Garcia  The above information is an  only  It is not intended as medical advice for individual conditions or treatments  Talk to your doctor, nurse or pharmacist before following any medical regimen to see if it is safe and effective for you

## 2022-08-29 NOTE — LETTER
August 30, 2022     Elisa Barnes, BRENDON    Patient: Clement Matta   YOB: 1975   Date of Visit: 8/29/2022       Dear Dr Luna Due:    Thank you for referring Clement Matta to me for evaluation  Below are my notes for this consultation  If you have questions, please do not hesitate to call me  I look forward to following your patient along with you  Sincerely,        Nadege Hinton MD        CC: No Recipients  Nadege Hinton MD  8/29/2022  3:26 PM  Signed  Assessment:  1  Chronic bilateral low back pain with left-sided sciatica    2  Lumbar paraspinal muscle spasm    3  Strain of lumbar region, subsequent encounter        Plan:  Orders Placed This Encounter   Procedures    MRI lumbar spine without contrast     Standing Status:   Future     Standing Expiration Date:   8/29/2026     Scheduling Instructions: There is no preparation for this test  Please leave your jewelry and valuables at home, wedding rings are the exception  All patients will be required to change into a hospital gown and pants  Street clothes are not permitted in the MRI  Magnetic nail polish must be removed prior to arrival for your test  Please bring your insurance cards, a form of photo ID and a list of your medications with you  Arrive 15 minutes prior to your appointment time in order to register  Please bring any prior CT or MRI studies of this area that were not performed at a [facility]  To schedule this appointment, please contact Central Scheduling at [phone]  Prior to your appointment, please make sure you complete the MRI Screening Form when you e-Check in for your appointment  This will be available starting 7 days before your appointment in [address]  You may receive an e-mail with an activation code if you do not have a [employer] account  If you do not have access to a device, we will complete your screening at your appointment       Order Specific Question:   What is the patient's sedation requirement? Answer:   No Sedation     Order Specific Question:   Release to patient through Mychart     Answer:   Immediate     Order Specific Question:   Is order priority selected as STAT? Answer:   No     Order Specific Question:   Reason for Exam (FREE TEXT)     Answer:   Persistent low back and left lower extremity  PT held due to lack of improvement  New Medications Ordered This Visit   Medications    methocarbamol (ROBAXIN) 500 mg tablet     Sig: TAKE 2 TABLETS BY MOUTH 3 TIMES A DAY FOR 14 DAYS    Naproxen Sodium 220 MG CAPS     Sig: Take 2 tablets by mouth    Diclofenac Sodium (VOLTAREN) 1 %     Sig: APPLY 2 GRAMS TO AREA OF PAIN 4 TIMES PER DAY AS NEEDED FOR PAIN    traZODone (DESYREL) 100 mg tablet     Sig: Take 100 mg by mouth daily       My impressions and treatment recommendations were discussed in detail with the patient, who verbalized understanding and had no further questions  This is a 19-year-old male who presents to our office with chief complaint of bilateral low back pain, left greater than right with radiation down the left lower extremity, down the posterior thigh, past the knee into the posterolateral calf  Issues began after fall in April 2022 with little relief from multiple over-the-counter analgesics including NSAIDs, muscle relaxers, oral and buccal opioids  He has also undergone multiple visits with physical therapy and treatment was halted due to lack of progression and worsening of symptoms  Differential diagnosis includes lumbar radiculopathy     Given lack of response to conservative therapies, will order MRI lumbar spine to assess for any significant compressive pathology    Discussed that he may be candidate for epidural steroid injection should imaging reveal significant compressive pathology      [facility] Prescription Drug Monitoring Program report was reviewed and was appropriate     Complete risks and benefits including bleeding, infection, tissue reaction, nerve injury and allergic reaction were discussed  The approach was demonstrated using models and literature was provided  Verbal and written consent was obtained  Discharge instructions were provided  I personally saw and examined the patient and I agree with the above discussed plan of care  History of Present Illness:    Pascale Sadler is a 41 y o  male who presents to [facility] for initial evaluation of the above stated pain complaints  The patient has a past medical and chronic pain history as outlined in the assessment section  He was referred by Unruly Paz, PT  No address on file   Patient is here with chief complaint of low back pain with radiation down the left lower extremity  This is a chronic issue for last 5 months  Stems from an injury on 04/09/2022 after falling down stairs  Moderate to severe intensity over the past month  8/10  Nearly constant  During the past month pain has been worse in the morning and evening  Described as shooting, numbness, pins and needles  Reports weakness in lower extremities  Does not ambulate with an assistive device  Increased with lying down, standing, bending, sitting  X-ray lumbar spine shows straightening of normal lordosis  Past medical history includes anxiety, depression, arthritis of the foot  Will be undergoing ankle fusion with orthopedic foot surgery  Next   No relief with PT  Moderate relief with heat/ice therapy  No tobacco marijuana use  Does not drink alcohol  Not on blood thinners  Not allergic to latex or contrast dye  In the past been on multiple opioids including MS Contin, Percocet, tramadol, and Butrans patch  PDMP shows multiple intermittent opioid prescriptions over the last 8 months  Muscle necessary prescribed Butrans patch in June 2022 by his primary care provider      Currently using Voltaren gel, acetaminophen, aspirin, ibuprofen, meloxicam  Reports no significant relief  In the past has tried Celebrex, Lidoderm, Relafen, naproxen, Toradol, Robaxin, gabapentin  Also has tried Zoloft, Valium, Ativan  Review of Systems:    Review of Systems   Constitutional: Negative for fever and unexpected weight change  HENT: Negative for trouble swallowing  Eyes: Negative for visual disturbance  Respiratory: Negative for shortness of breath and wheezing  Cardiovascular: Negative for chest pain and palpitations  Gastrointestinal: Negative for constipation, diarrhea, nausea and vomiting  Endocrine: Positive for polydipsia  Negative for cold intolerance and heat intolerance  Genitourinary: Negative for difficulty urinating and frequency  Musculoskeletal: Positive for arthralgias, joint swelling and myalgias  Negative for gait problem  Skin: Negative for rash  Neurological: Positive for numbness  Negative for dizziness, seizures, syncope, weakness and headaches  Hematological: Does not bruise/bleed easily  Psychiatric/Behavioral: Positive for decreased concentration  Negative for dysphoric mood  Anxiety  Depression    All other systems reviewed and are negative  Patient Active Problem List   Diagnosis    Arthritis of left subtalar joint       History reviewed  No pertinent past medical history  Past Surgical History:   Procedure Laterality Date    ANKLE FRACTURE SURGERY Left 09/27/2021       History reviewed  No pertinent family history      Social History     Occupational History    Not on file   Tobacco Use    Smoking status: Former Smoker    Smokeless tobacco: Never Used   Vaping Use    Vaping Use: Never used   Substance and Sexual Activity    Alcohol use: Yes     Comment: occassional    Drug use: Never    Sexual activity: Not on file         Current Outpatient Medications:     diclofenac (VOLTAREN) 75 mg EC tablet, Take by mouth 2 (two) times a day, Disp: , Rfl:     Diclofenac Sodium (VOLTAREN) 1 %, APPLY 2 GRAMS TO AREA OF PAIN 4 TIMES PER DAY AS NEEDED FOR PAIN, Disp: , Rfl:     meloxicam (Mobic) 15 mg tablet, Take 1 tablet (15 mg total) by mouth daily, Disp: 30 tablet, Rfl: 1    methocarbamol (ROBAXIN) 500 mg tablet, TAKE 2 TABLETS BY MOUTH 3 TIMES A DAY FOR 14 DAYS, Disp: , Rfl:     Naproxen Sodium 220 MG CAPS, Take 2 tablets by mouth, Disp: , Rfl:     traZODone (DESYREL) 100 mg tablet, Take 100 mg by mouth daily, Disp: , Rfl:     buprenorphine-naloxone (Suboxone) 2-0 5 mg, PLACE UNDER THE TONGUE 1/2 FILM IN THE MORNING FOR 14 DAYS  FOR PAIN  (Patient not taking: No sig reported), Disp: , Rfl:     celecoxib (CeleBREX) 200 mg capsule, Take 200 mg by mouth daily (Patient not taking: No sig reported), Disp: , Rfl:     lidocaine (LIDODERM) 5 %, Apply 1 patch topically daily Remove & Discard patch within 12 hours or as directed by MD (Patient not taking: No sig reported), Disp: , Rfl:     methocarbamol (ROBAXIN) 500 mg tablet, TAKE 2 TABLETS BY MOUTH 3 TIMES A DAY FOR 14 DAYS (Patient not taking: No sig reported), Disp: , Rfl:     methocarbamol (ROBAXIN) 750 mg tablet, Take 750 mg by mouth every 6 (six) hours as needed for muscle spasms (Patient not taking: No sig reported), Disp: , Rfl:     OLANZapine (ZyPREXA) 2 5 mg tablet, Take 2 5 mg by mouth daily at bedtime (Patient not taking: No sig reported), Disp: , Rfl:     polyethylene glycol (GLYCOLAX) 17 GM/SCOOP powder, Mix 1 packet with 6 oz of water or juice twice weekly for constipation (Patient not taking: No sig reported), Disp: , Rfl:     traZODone (DESYREL) 100 mg tablet, TAKE BY MOUTH 1 TABLET BEFORE BEDTIME  FOR SLEEP   (Patient not taking: No sig reported), Disp: , Rfl:     Allergies   Allergen Reactions    Tramadol Nausea Only       Physical Exam:    /86 (BP Location: Left arm, Patient Position: Sitting, Cuff Size: Standard)   Pulse 85   Ht 5' 9" (1 753 m)   Wt 106 kg (232 lb 12 8 oz)   BMI 34 38 kg/m²     Constitutional: normal, well developed, well nourished, alert, in no distress and non-toxic and no overt pain behavior  Eyes: anicteric  HEENT: grossly intact  Neck: supple, symmetric, trachea midline and no masses   Pulmonary:even and unlabored  Cardiovascular:No edema or pitting edema present  Skin:Normal without rashes or lesions and well hydrated  Psychiatric:Mood and affect appropriate  Neurologic:Cranial Nerves II-XII grossly intact  Musculoskeletal:normal     Lumbar Spine Exam    Appearance:  Reversal of lordosis  Palpation/Tenderness:  left lumbar paraspinal tenderness  right lumbar paraspinal tenderness  left sacroiliac joint tenderness  right sacroiliac joint tenderness  Sensory:  no sensory deficits noted  Range of Motion:  Limited in all planes secondary to pain  Motor Strength:  Left hip flexion:  4/5  Left hip extension:  4/5  Right hip flexion:  5/5  Right hip extension:  5/5  Left knee flexion:  4/5  Left knee extension:  4/5  Right knee flexion:  5/5  Right knee extension:  5/5  Left foot dorsiflexion:  4/5  Left foot plantar flexion:  4/5  Right foot dorsiflexion:  5/5  Right foot plantar flexion:  5/5   Poor patient effort secondary to pain  Reflexes:  Left Patellar:  2+   Right Patellar:  2+   Left Achilles:  2+   Right Achilles:  2+   Special Tests:  Left Straight Leg Test:  negative  Right Straight Leg Test:  negative  Left Tk's Maneuver:  positive  Right Tk's Maneuver:  positive      Imaging    5/10/2022  Vertebra -- Digital Radiography   Lspine -- --       Impression    IMPRESSION   Minimal L5-S1 disc space narrowing   No evidence of fracture  Narrative    EXAM   XR L SPINE AP AND LATERAL-5/10/2022 3:21 pm     HISTORY   midline pain and numbness, fall 2 weeks     COMPARISON   None  TECHNIQUE   Three radiographs of the lumbar spine       FINDINGS   There are 5 non-rib-bearing lumbar type vertebral bodies   Straightening of the lumbar spine   No evidence of fracture or spondylolisthesis   Minimal disc space narrowing at L5-S1  Presumed cholecystectomy clips are seen  Procedure Note    Kayleen Hull MD - 05/25/2022   Formatting of this note might be different from the original    EXAM   XR L SPINE AP AND LATERAL-5/10/2022 3:21 pm     HISTORY   midline pain and numbness, fall 2 weeks     COMPARISON   None  TECHNIQUE   Three radiographs of the lumbar spine  FINDINGS   There are 5 non-rib-bearing lumbar type vertebral bodies   Straightening of the lumbar spine   No evidence of fracture or spondylolisthesis   Minimal disc space narrowing at L5-S1  Presumed cholecystectomy clips are seen       MRI lumbar spine without contrast    (Results Pending)       Orders Placed This Encounter   Procedures    MRI lumbar spine without contrast

## 2022-09-13 NOTE — PROGRESS NOTES
PT DISCHARGE:     Patient was last seen on 8/11/22  At that time, he was referred to pain management  He is getting an MRI and remains under the care of pain management  Spoke with patient on 9/13/22  He is aware that his case will be closed, but I am available for further evaluation, if ordered by physician

## 2022-09-26 ENCOUNTER — HOSPITAL ENCOUNTER (OUTPATIENT)
Dept: MRI IMAGING | Facility: CLINIC | Age: 47
Discharge: HOME | End: 2022-09-26
Payer: COMMERCIAL

## 2022-09-26 DIAGNOSIS — G89.29 CHRONIC BILATERAL LOW BACK PAIN WITH LEFT-SIDED SCIATICA: ICD-10-CM

## 2022-09-26 DIAGNOSIS — M54.42 CHRONIC BILATERAL LOW BACK PAIN WITH LEFT-SIDED SCIATICA: ICD-10-CM

## 2022-09-26 PROCEDURE — 72148 MRI LUMBAR SPINE W/O DYE: CPT

## 2022-09-26 PROCEDURE — G1004 CDSM NDSC: HCPCS

## 2022-09-29 ENCOUNTER — TELEPHONE (OUTPATIENT)
Dept: RADIOLOGY | Facility: CLINIC | Age: 47
End: 2022-09-29

## 2022-09-29 NOTE — TELEPHONE ENCOUNTER
----- Message from George Zeng MD sent at 9/29/2022  3:37 PM EDT -----  Patient MRI shows a protruding disc towards the left at his L5-S1 level  It is irritating his S1 nerve and likely explains the type of pain he was describing to me going on left leg  Certainly candidate for left L5 and S1 TFESI

## 2022-10-04 ENCOUNTER — TELEPHONE (OUTPATIENT)
Dept: RADIOLOGY | Facility: CLINIC | Age: 47
End: 2022-10-04

## 2022-10-04 NOTE — TELEPHONE ENCOUNTER
----- Message from Marcia Guzmán sent at 10/3/2022 11:31 PM EDT -----  Regarding: [facility]  Yes I would   for tuesday morning on 9/11/22 that would be the best available day for me   is that ok

## 2022-10-04 NOTE — TELEPHONE ENCOUNTER
-    Please obtain auth for TFESI      Message from Betsy Jay MD sent at 9/29/2022  3:37 PM EDT -----  Patient MRI shows a protruding disc towards the left at his L5-S1 level  It is irritating his S1 nerve and likely explains the type of pain he was describing to me going on left leg  Certainly candidate for left L5 and S1 TFESI

## 2022-10-06 NOTE — TELEPHONE ENCOUNTER
E-mail to Avelina Juarez at [employer]:    Christiano Quan,  I submitted an AOB on-line back in November for this patient and she just called saying she has not heard anything.  Could you check into this?    Kiera Stage  [birth year]  phone: [phone]  Thanks for all of your help!  Arlin      Reply:    Christiano Oliveros,     Thank you for reaching out! I show that we contacted Kiera at [phone] since September but no response back so we closed her file. I will ask my team to call her ASAP. ??           Avelina Juarez    Ph: [phone]    Phone call to Kiera:  Let her know they are out of [place] and will be calling soon.  Kiera will watch her phone.   Arlin Calles RN,CDE     Have auth for TFESI  Lmovm for pt - need to schedule

## 2022-10-18 ENCOUNTER — OFFICE VISIT (OUTPATIENT)
Dept: INTERNAL MEDICINE CLINIC | Facility: CLINIC | Age: 47
End: 2022-10-18
Payer: COMMERCIAL

## 2022-10-18 ENCOUNTER — TELEPHONE (OUTPATIENT)
Dept: ADMINISTRATIVE | Facility: OTHER | Age: 47
End: 2022-10-18

## 2022-10-18 ENCOUNTER — APPOINTMENT (OUTPATIENT)
Dept: LAB | Facility: CLINIC | Age: 47
End: 2022-10-18
Payer: COMMERCIAL

## 2022-10-18 VITALS
OXYGEN SATURATION: 98 % | BODY MASS INDEX: 34.1 KG/M2 | RESPIRATION RATE: 18 BRPM | HEART RATE: 82 BPM | TEMPERATURE: 97.9 F | WEIGHT: 230.2 LBS | SYSTOLIC BLOOD PRESSURE: 124 MMHG | DIASTOLIC BLOOD PRESSURE: 82 MMHG | HEIGHT: 69 IN

## 2022-10-18 DIAGNOSIS — Z00.00 HEALTHCARE MAINTENANCE: Primary | ICD-10-CM

## 2022-10-18 DIAGNOSIS — M19.072 ARTHRITIS OF LEFT SUBTALAR JOINT: ICD-10-CM

## 2022-10-18 DIAGNOSIS — Z00.00 HEALTHCARE MAINTENANCE: ICD-10-CM

## 2022-10-18 LAB
ALBUMIN SERPL BCP-MCNC: 3.9 G/DL (ref 3.5–5)
ALP SERPL-CCNC: 58 U/L (ref 46–116)
ALT SERPL W P-5'-P-CCNC: 22 U/L (ref 12–78)
ANION GAP SERPL CALCULATED.3IONS-SCNC: 2 MMOL/L (ref 4–13)
AST SERPL W P-5'-P-CCNC: 10 U/L (ref 5–45)
BACTERIA UR QL AUTO: ABNORMAL /HPF
BASOPHILS # BLD AUTO: 0.04 THOUSANDS/ΜL (ref 0–0.1)
BASOPHILS NFR BLD AUTO: 1 % (ref 0–1)
BILIRUB SERPL-MCNC: 0.5 MG/DL (ref 0.2–1)
BILIRUB UR QL STRIP: NEGATIVE
BUN SERPL-MCNC: 11 MG/DL (ref 5–25)
CALCIUM SERPL-MCNC: 9.1 MG/DL (ref 8.3–10.1)
CAOX CRY URNS QL MICRO: ABNORMAL /HPF
CHLORIDE SERPL-SCNC: 109 MMOL/L (ref 96–108)
CHOLEST SERPL-MCNC: 155 MG/DL
CLARITY UR: CLEAR
CO2 SERPL-SCNC: 29 MMOL/L (ref 21–32)
COLOR UR: YELLOW
CREAT SERPL-MCNC: 0.99 MG/DL (ref 0.6–1.3)
EOSINOPHIL # BLD AUTO: 0.15 THOUSAND/ΜL (ref 0–0.61)
EOSINOPHIL NFR BLD AUTO: 2 % (ref 0–6)
ERYTHROCYTE [DISTWIDTH] IN BLOOD BY AUTOMATED COUNT: 13.7 % (ref 11.6–15.1)
GFR SERPL CREATININE-BSD FRML MDRD: 91 ML/MIN/1.73SQ M
GLUCOSE P FAST SERPL-MCNC: 86 MG/DL (ref 65–99)
GLUCOSE UR STRIP-MCNC: NEGATIVE MG/DL
HCT VFR BLD AUTO: 43.6 % (ref 36.5–49.3)
HDLC SERPL-MCNC: 52 MG/DL
HGB BLD-MCNC: 14.1 G/DL (ref 12–17)
HGB UR QL STRIP.AUTO: ABNORMAL
IMM GRANULOCYTES # BLD AUTO: 0.02 THOUSAND/UL (ref 0–0.2)
IMM GRANULOCYTES NFR BLD AUTO: 0 % (ref 0–2)
KETONES UR STRIP-MCNC: NEGATIVE MG/DL
LDLC SERPL CALC-MCNC: 92 MG/DL (ref 0–100)
LEUKOCYTE ESTERASE UR QL STRIP: NEGATIVE
LYMPHOCYTES # BLD AUTO: 1.99 THOUSANDS/ΜL (ref 0.6–4.47)
LYMPHOCYTES NFR BLD AUTO: 28 % (ref 14–44)
MCH RBC QN AUTO: 28.6 PG (ref 26.8–34.3)
MCHC RBC AUTO-ENTMCNC: 32.3 G/DL (ref 31.4–37.4)
MCV RBC AUTO: 88 FL (ref 82–98)
MONOCYTES # BLD AUTO: 0.4 THOUSAND/ΜL (ref 0.17–1.22)
MONOCYTES NFR BLD AUTO: 6 % (ref 4–12)
MUCOUS THREADS UR QL AUTO: ABNORMAL
NEUTROPHILS # BLD AUTO: 4.58 THOUSANDS/ΜL (ref 1.85–7.62)
NEUTS SEG NFR BLD AUTO: 63 % (ref 43–75)
NITRITE UR QL STRIP: NEGATIVE
NON-SQ EPI CELLS URNS QL MICRO: ABNORMAL /HPF
NRBC BLD AUTO-RTO: 0 /100 WBCS
PH UR STRIP.AUTO: 6 [PH]
PLATELET # BLD AUTO: 309 THOUSANDS/UL (ref 149–390)
PMV BLD AUTO: 10.7 FL (ref 8.9–12.7)
POTASSIUM SERPL-SCNC: 4.1 MMOL/L (ref 3.5–5.3)
PROT SERPL-MCNC: 8.1 G/DL (ref 6.4–8.4)
PROT UR STRIP-MCNC: ABNORMAL MG/DL
PSA SERPL-MCNC: 1.2 NG/ML (ref 0–4)
RBC # BLD AUTO: 4.93 MILLION/UL (ref 3.88–5.62)
RBC #/AREA URNS AUTO: ABNORMAL /HPF
SODIUM SERPL-SCNC: 140 MMOL/L (ref 135–147)
SP GR UR STRIP.AUTO: 1.03 (ref 1–1.03)
TRIGL SERPL-MCNC: 56 MG/DL
TSH SERPL DL<=0.05 MIU/L-ACNC: 1.61 UIU/ML (ref 0.45–4.5)
UROBILINOGEN UR STRIP-ACNC: <2 MG/DL
WBC # BLD AUTO: 7.18 THOUSAND/UL (ref 4.31–10.16)
WBC #/AREA URNS AUTO: ABNORMAL /HPF

## 2022-10-18 PROCEDURE — 80053 COMPREHEN METABOLIC PANEL: CPT

## 2022-10-18 PROCEDURE — 87389 HIV-1 AG W/HIV-1&-2 AB AG IA: CPT

## 2022-10-18 PROCEDURE — 85025 COMPLETE CBC W/AUTO DIFF WBC: CPT

## 2022-10-18 PROCEDURE — 80061 LIPID PANEL: CPT

## 2022-10-18 PROCEDURE — G0103 PSA SCREENING: HCPCS

## 2022-10-18 PROCEDURE — 99204 OFFICE O/P NEW MOD 45 MIN: CPT | Performed by: INTERNAL MEDICINE

## 2022-10-18 PROCEDURE — 83036 HEMOGLOBIN GLYCOSYLATED A1C: CPT

## 2022-10-18 PROCEDURE — 84443 ASSAY THYROID STIM HORMONE: CPT

## 2022-10-18 PROCEDURE — 36415 COLL VENOUS BLD VENIPUNCTURE: CPT

## 2022-10-18 PROCEDURE — 81001 URINALYSIS AUTO W/SCOPE: CPT | Performed by: INTERNAL MEDICINE

## 2022-10-18 PROCEDURE — 86803 HEPATITIS C AB TEST: CPT

## 2022-10-18 RX ORDER — GABAPENTIN 100 MG/1
100 CAPSULE ORAL 3 TIMES DAILY
Qty: 90 CAPSULE | Refills: 5 | Status: SHIPPED | OUTPATIENT
Start: 2022-10-18

## 2022-10-18 RX ORDER — NORTRIPTYLINE HYDROCHLORIDE 10 MG/1
CAPSULE ORAL
COMMUNITY
Start: 2022-10-05

## 2022-10-18 RX ORDER — DICLOFENAC SODIUM 30 MG/G
1 GEL TOPICAL 4 TIMES DAILY
Qty: 35 G | Refills: 11 | Status: SHIPPED | OUTPATIENT
Start: 2022-10-18

## 2022-10-18 RX ORDER — NORTRIPTYLINE HYDROCHLORIDE 25 MG/1
25 CAPSULE ORAL DAILY
COMMUNITY
Start: 2022-10-05

## 2022-10-18 NOTE — TELEPHONE ENCOUNTER
Upon review of the In Basket request we were able to locate, review, and update the patient chart as requested for CRC: Alexis  Any additional questions or concerns should be emailed to the Practice Liaisons via the appropriate education email address, please do not reply via In Basket      Thank you  Angie Henry

## 2022-10-18 NOTE — TELEPHONE ENCOUNTER
----- Message from Roberta Gary sent at 10/18/2022 10:36 AM EDT -----  Regarding: irwin  10/18/22 10:37 AM    Hello, our patient Theodora Klein has had CRC: Irwin completed/performed  Please assist in updating the patient chart by pulling the Care Everywhere (CE) document  The date of service is 08/23/22       Thank you,  Tyrel Macedo  [address]

## 2022-10-18 NOTE — PROGRESS NOTES
Assessment/Plan:       Diagnoses and all orders for this visit:    Healthcare maintenance  -     PSA, Total Screen; Future  -     Lipid Panel with Direct LDL reflex; Future  -     CBC and differential; Future  -     Hemoglobin A1C; Future  -     Comprehensive metabolic panel; Future  -     TSH, 3rd generation with Free T4 reflex; Future  -     UA (URINE) with reflex to Scope  -     Hepatitis C antibody; Future  -     HIV 1/2 Antigen/Antibody (4th Generation) w Reflex [facility]; Future    Arthritis of left subtalar joint  -     gabapentin (Neurontin) 100 mg capsule; Take 1 capsule (100 mg total) by mouth 3 (three) times a day    Other orders  -     nortriptyline (PAMELOR) 10 mg capsule; TAKE BY MOUTH 1 CAPSULE BEFORE BEDTIME  TAKE WITH 25MG DOSE FOR A TOTAL OF 35MG NIGHTLY  -     nortriptyline (PAMELOR) 25 mg capsule; Take 25 mg by mouth daily                Subjective:      Patient ID: Moon Meek is a 41 y o  male  Posttraumatic arthropathy    About 1-1/2 years ago this 79-year-old individual sustained a fracture of the left ankle  He required surgery  However, since that time he has had chronic unrelenting pain  He has been on nonsteroidal anti-inflammatory drugs, Tylenol, and muscle relaxants, without pain relief  He has been to pain management and he has had corticosteroid injection  He has had that twice  He has been to PT  Pain still persists  Pain management actually propose chronic narcotics but he does not want to do that    So his diagnosis is chronic posttraumatic arthropathy  He may be looking at another surgery but he has been told that because he had a very recent corticosteroid injection that could not even be considered for a while  Has chronic low back pain due to another fall  Seeing another physician and will be receiving epidural injections  Surgical history in addition to the ankle is cholecystectomy and umbilical hernia repair      Has no chronic metabolic disorder  Did a Cologuard which was normal   This was a few months ago  Colmicaela was a patient choice  He is low risk  No cigarettes  No alcohol  No illicit drugs  Lives with his aunt and uncle  He is not working since this although he is not actually on [employer]  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 [employer]  Parents are   Dad had some kind of skin cancer possibly melanoma  Mom had COPD  The following portions of the patient's history were reviewed and updated as appropriate:   He has no past medical history on file  ,  does not have any pertinent problems on file  ,   has a past surgical history that includes Ankle fracture surgery (Left, 2021)  ,  family history is not on file  ,   reports that he has quit smoking  He has never used smokeless tobacco  He reports current alcohol use  He reports that he does not use drugs  ,  is allergic to tramadol     Current Outpatient Medications   Medication Sig Dispense Refill   • Diclofenac Sodium (VOLTAREN) 1 % APPLY 2 GRAMS TO AREA OF PAIN 4 TIMES PER DAY AS NEEDED FOR PAIN     • gabapentin (Neurontin) 100 mg capsule Take 1 capsule (100 mg total) by mouth 3 (three) times a day 90 capsule 5   • meloxicam (Mobic) 15 mg tablet Take 1 tablet (15 mg total) by mouth daily 30 tablet 1   • Naproxen Sodium 220 MG CAPS Take 2 tablets by mouth     • nortriptyline (PAMELOR) 10 mg capsule TAKE BY MOUTH 1 CAPSULE BEFORE BEDTIME  TAKE WITH 25MG DOSE FOR A TOTAL OF 35MG NIGHTLY       • nortriptyline (PAMELOR) 25 mg capsule Take 25 mg by mouth daily     • traZODone (DESYREL) 100 mg tablet Take 100 mg by mouth daily     • OLANZapine (ZyPREXA) 2 5 mg tablet Take 2 5 mg by mouth daily at bedtime (Patient not taking: No sig reported)     • polyethylene glycol (GLYCOLAX) 17 GM/SCOOP powder Mix 1 packet with 6 oz of water or juice twice weekly for constipation (Patient not taking: No sig reported) • traZODone (DESYREL) 100 mg tablet TAKE BY MOUTH 1 TABLET BEFORE BEDTIME  FOR SLEEP  (Patient not taking: No sig reported)       No current facility-administered medications for this visit  Review of Systems   Musculoskeletal: Positive for arthralgias  All other systems reviewed and are negative  Objective:  Vitals:    10/18/22 1029   BP: 124/82   Pulse: 82   Resp: 18   Temp: 97 9 °F (36 6 °C)   SpO2: 98%      Physical Exam  Constitutional:       Appearance: He is well-developed  Comments: Overweight male who appears to be the stated age   HENT:      Head: Normocephalic and atraumatic  Eyes:      Pupils: Pupils are equal, round, and reactive to light  Neck:      Thyroid: No thyromegaly  Trachea: No tracheal deviation  Cardiovascular:      Rate and Rhythm: Normal rate and regular rhythm  Heart sounds: Normal heart sounds  No murmur heard  No gallop  Pulmonary:      Effort: Pulmonary effort is normal  No respiratory distress  Breath sounds: No wheezing or rales  Abdominal:      General: Bowel sounds are normal       Palpations: Abdomen is soft  Tenderness: There is no abdominal tenderness  Musculoskeletal:         General: Deformity present  No tenderness  Normal range of motion  Cervical back: Normal range of motion and neck supple  Skin:     General: Skin is warm and dry  Neurological:      Mental Status: He is alert and oriented to person, place, and time  Coordination: Coordination normal       Deep Tendon Reflexes: Reflexes are normal and symmetric  Psychiatric:         Mood and Affect: Mood normal          Judgment: Judgment normal            There are no Patient Instructions on file for this visit

## 2022-10-19 LAB
EST. AVERAGE GLUCOSE BLD GHB EST-MCNC: 114 MG/DL
HBA1C MFR BLD: 5.6 %
HCV AB SER QL: NORMAL
HIV 1+2 AB+HIV1 P24 AG SERPL QL IA: NORMAL

## 2022-10-21 ENCOUNTER — TELEPHONE (OUTPATIENT)
Dept: PAIN MEDICINE | Facility: CLINIC | Age: 47
End: 2022-10-21

## 2022-10-21 NOTE — TELEPHONE ENCOUNTER
Caller: patient     Doctor: Roslyn Jett    Reason for call: returning procedure schedulers call    Call back#: [phone]

## 2022-10-21 NOTE — TELEPHONE ENCOUNTER
S/w pt and scheduled TFESI for 11/1/22 130 pm arrival  Gave procedure instructions and /vaccine policy, sent thru [facility] also

## 2022-10-25 ENCOUNTER — TELEPHONE (OUTPATIENT)
Dept: PAIN MEDICINE | Facility: MEDICAL CENTER | Age: 47
End: 2022-10-25

## 2022-10-25 NOTE — TELEPHONE ENCOUNTER
Caller: Princess Finch    Doctor: Dr Pepe Bird    Reason for call: Needs copy of MRI report will cb with fax#    Call back#:

## 2022-11-15 ENCOUNTER — HOSPITAL ENCOUNTER (OUTPATIENT)
Dept: RADIOLOGY | Facility: CLINIC | Age: 47
Discharge: HOME | End: 2022-11-15

## 2022-11-15 VITALS
OXYGEN SATURATION: 98 % | TEMPERATURE: 97 F | DIASTOLIC BLOOD PRESSURE: 94 MMHG | HEART RATE: 83 BPM | SYSTOLIC BLOOD PRESSURE: 152 MMHG | RESPIRATION RATE: 20 BRPM

## 2022-11-15 DIAGNOSIS — M54.42 CHRONIC BILATERAL LOW BACK PAIN WITH LEFT-SIDED SCIATICA: ICD-10-CM

## 2022-11-15 DIAGNOSIS — G89.29 CHRONIC BILATERAL LOW BACK PAIN WITH LEFT-SIDED SCIATICA: ICD-10-CM

## 2022-11-15 RX ORDER — PAPAVERINE HCL 150 MG
20 CAPSULE, EXTENDED RELEASE ORAL ONCE
Status: COMPLETED | OUTPATIENT
Start: 2022-11-15 | End: 2022-11-15

## 2022-11-15 RX ORDER — BUPIVACAINE HCL/PF 2.5 MG/ML
2 VIAL (ML) INJECTION ONCE
Status: COMPLETED | OUTPATIENT
Start: 2022-11-15 | End: 2022-11-15

## 2022-11-15 RX ADMIN — DEXAMETHASONE SODIUM PHOSPHATE 20 MG: 10 INJECTION, SOLUTION INTRAMUSCULAR; INTRAVENOUS at 14:17

## 2022-11-15 RX ADMIN — Medication 2 ML: at 14:17

## 2022-11-15 RX ADMIN — IOHEXOL 1 ML: 300 INJECTION, SOLUTION INTRAVENOUS at 14:16

## 2022-11-15 NOTE — DISCHARGE INSTR - LAB
Epidural Steroid Injection   WHAT YOU NEED TO KNOW:   An epidural steroid injection (ALISA) is a procedure to inject steroid medicine into the epidural space  The epidural space is between your spinal cord and vertebrae  Steroids reduce inflammation and fluid buildup in your spine that may be causing pain  You may be given pain medicine along with the steroids  ACTIVITY  Do not drive or operate machinery today  No strenuous activity today - bending, lifting, etc   You may resume normal activites starting tomorrow - start slowly and as tolerated  You may shower today, but no tub baths or hot tubs  You may have numbness for several hours from the local anesthetic  Please use caution and common sense, especially with weight-bearing activities  CARE OF THE INJECTION SITE  If you have soreness or pain, apply ice to the area today (20 minutes on/20 minutes off)  Starting tomorrow, you may use warm, moist heat or ice if needed  You may have an increase or change in your discomfort for 36-48 hours after your treatment  Apply ice and continue with any pain medication you have been prescribed  Notify the [facility] if you have any of the following: redness, drainage, swelling, headache, stiff neck or fever above 100°F     SPECIAL INSTRUCTIONS  Our office will contact you in approximately 7 days for a progress report  MEDICATIONS  Continue to take all routine medications  Our office may have instructed you to hold some medications  As no general anesthesia was used in today's procedure, you should not experience any side effects related to anesthesia  If you are diabetic, the steroids used in today's injection may temporarily increase your blood sugar levels after the first few days after your injection  Please keep a close eye on your sugars and alert the doctor who manages your diabetes if your sugars are significantly high from your baseline or you are symptomatic       If you have a problem specifically related to your procedure, please call our office at [phone]  Problems not related to your procedure should be directed to your primary care physician

## 2022-11-15 NOTE — H&P
History of Present Illness: The patient is a 41 y o  male who presents with complaints of left leg pain    No past medical history on file  Past Surgical History:   Procedure Laterality Date   • ANKLE FRACTURE SURGERY Left 09/27/2021         Current Outpatient Medications:   •  Diclofenac Sodium (VOLTAREN) 1 %, APPLY 2 GRAMS TO AREA OF PAIN 4 TIMES PER DAY AS NEEDED FOR PAIN, Disp: , Rfl:   •  Diclofenac Sodium 3 % GEL, Apply 1 application topically 4 (four) times a day, Disp: 35 g, Rfl: 11  •  gabapentin (Neurontin) 100 mg capsule, Take 1 capsule (100 mg total) by mouth 3 (three) times a day, Disp: 90 capsule, Rfl: 5  •  meloxicam (Mobic) 15 mg tablet, Take 1 tablet (15 mg total) by mouth daily, Disp: 30 tablet, Rfl: 1  •  Naproxen Sodium 220 MG CAPS, Take 2 tablets by mouth, Disp: , Rfl:   •  nortriptyline (PAMELOR) 10 mg capsule, TAKE BY MOUTH 1 CAPSULE BEFORE BEDTIME  TAKE WITH 25MG DOSE FOR A TOTAL OF 35MG NIGHTLY , Disp: , Rfl:   •  nortriptyline (PAMELOR) 25 mg capsule, Take 25 mg by mouth daily, Disp: , Rfl:   •  OLANZapine (ZyPREXA) 2 5 mg tablet, Take 2 5 mg by mouth daily at bedtime (Patient not taking: No sig reported), Disp: , Rfl:   •  polyethylene glycol (GLYCOLAX) 17 GM/SCOOP powder, Mix 1 packet with 6 oz of water or juice twice weekly for constipation (Patient not taking: No sig reported), Disp: , Rfl:   •  traZODone (DESYREL) 100 mg tablet, TAKE BY MOUTH 1 TABLET BEFORE BEDTIME  FOR SLEEP   (Patient not taking: No sig reported), Disp: , Rfl:   •  traZODone (DESYREL) 100 mg tablet, Take 100 mg by mouth daily, Disp: , Rfl:     Allergies   Allergen Reactions   • Tramadol Nausea Only       Physical Exam:   Vitals:    11/15/22 1357   BP: 139/90   Pulse: 87   Resp: 20   Temp: (!) 97 °F (36 1 °C)   SpO2: 96%     General: Awake, Alert, Oriented x 3, Mood and affect appropriate  Respiratory: Respirations even and unlabored  Cardiovascular: Peripheral pulses intact; no edema  Musculoskeletal Exam: TTP left lumbar    ASA Score: 3    Patient/Chart Verification  Patient ID Verified: Verbal  ID Band Applied: No  Consents Confirmed: To be obtained in the Pre-Procedure area  H&P( within 30 days) Verified: To be obtained in the Pre-Procedure area  Interval H&P(within 24 hr) Complete (required for Outpatients and Surgery Admit only): To be obtained in the Pre-Procedure area  Allergies Reviewed: Yes  Anticoag/NSAID held?: NA  Currently on antibiotics?: No  Pregnancy denied?: NA    Assessment:   1   Chronic bilateral low back pain with left-sided sciatica        Plan: left L5 and S1 TFESI

## 2022-12-17 PROBLEM — Z00.00 HEALTHCARE MAINTENANCE: Status: RESOLVED | Noted: 2022-10-18 | Resolved: 2022-12-17

## 2023-01-04 ENCOUNTER — TELEPHONE (OUTPATIENT)
Dept: PAIN MEDICINE | Facility: MEDICAL CENTER | Age: 48
End: 2023-01-04

## 2023-01-04 NOTE — TELEPHONE ENCOUNTER
Caller: Ingris Shah     Doctor: Dr Moreno     Reason for call: Patient calling to schedule procedure     Call back#: [phone]

## 2023-01-05 NOTE — TELEPHONE ENCOUNTER
LMOM to CB, CB# provided  Pt last had Left L5 and S1 transforaminal epidural steroid injection  on 11/15

## 2023-01-05 NOTE — TELEPHONE ENCOUNTER
Caller: Ingris Shah     Doctor:Josh      Reason for call: Patient calling to schedule procedure      Call back#: [phone]

## 2023-01-09 ENCOUNTER — TELEPHONE (OUTPATIENT)
Dept: INTERNAL MEDICINE CLINIC | Facility: CLINIC | Age: 48
End: 2023-01-09

## 2023-01-09 NOTE — TELEPHONE ENCOUNTER
Does pt want to repeat left sided TFESI?  Is pain the same?  Did previous injection help?    Lm for pt to cb

## 2023-01-09 NOTE — TELEPHONE ENCOUNTER
PA has been started for the Diclofenac Sodium 3% gel, patients key is: [id number]  PA has been printed and processed and has been placed in the physicians office to be signed and then faxed to prescription plan for the patient

## 2023-01-09 NOTE — TELEPHONE ENCOUNTER
Caller: Ingris Shah     Doctor:Josh      Reason for call: Patient returned call from nurse     Call back#: [phone]

## 2023-01-11 NOTE — TELEPHONE ENCOUNTER
S/w pt, states he has pain again on his lower back 9/10. He takes aleve, uses ice/heat. Advised to take tylenol 1000 mg every 8 hrs prn. Pt is requesting rpt injection for his back, clarified if it is his left side of his back, pt said lower back, not left or right. Pt had left L5-S1 TFESI 11/15 with 50% relief. Pls advise.

## 2023-01-11 NOTE — TELEPHONE ENCOUNTER
Pt says he had pain down also to his left leg, same pain from before.  Pls call pt to schedule left L5-S1 TFESI.

## 2023-01-11 NOTE — TELEPHONE ENCOUNTER
Last injection was for pain that was going down left leg. If its not the same pain that improved with injection then may need to f/u with MG for reeval . If same, ok to schedule.

## 2023-01-12 NOTE — TELEPHONE ENCOUNTER
S/w pt and scheduled TFESI for 1/31/23 145 pm arrival. Gave pre procedure instructions and /vaccine policy.

## 2023-01-31 ENCOUNTER — HOSPITAL ENCOUNTER (OUTPATIENT)
Dept: RADIOLOGY | Facility: CLINIC | Age: 48
Discharge: HOME | End: 2023-01-31

## 2023-01-31 VITALS
HEART RATE: 76 BPM | TEMPERATURE: 97.5 F | SYSTOLIC BLOOD PRESSURE: 137 MMHG | OXYGEN SATURATION: 92 % | RESPIRATION RATE: 20 BRPM | DIASTOLIC BLOOD PRESSURE: 94 MMHG

## 2023-01-31 DIAGNOSIS — G89.29 CHRONIC BILATERAL LOW BACK PAIN WITH LEFT-SIDED SCIATICA: ICD-10-CM

## 2023-01-31 DIAGNOSIS — M54.42 CHRONIC BILATERAL LOW BACK PAIN WITH LEFT-SIDED SCIATICA: ICD-10-CM

## 2023-01-31 RX ORDER — PAPAVERINE HCL 150 MG
20 CAPSULE, EXTENDED RELEASE ORAL ONCE
Status: COMPLETED | OUTPATIENT
Start: 2023-01-31 | End: 2023-01-31

## 2023-01-31 RX ORDER — BUPIVACAINE HCL/PF 2.5 MG/ML
2 VIAL (ML) INJECTION ONCE
Status: COMPLETED | OUTPATIENT
Start: 2023-01-31 | End: 2023-01-31

## 2023-01-31 RX ADMIN — DEXAMETHASONE SODIUM PHOSPHATE 20 MG: 10 INJECTION, SOLUTION INTRAMUSCULAR; INTRAVENOUS at 14:40

## 2023-01-31 RX ADMIN — IOHEXOL 1 ML: 300 INJECTION, SOLUTION INTRAVENOUS at 14:39

## 2023-01-31 RX ADMIN — Medication 2 ML: at 14:40

## 2023-01-31 NOTE — DISCHARGE INSTR - LAB
Epidural Steroid Injection   WHAT YOU NEED TO KNOW:   An epidural steroid injection (ALISA) is a procedure to inject steroid medicine into the epidural space  The epidural space is between your spinal cord and vertebrae  Steroids reduce inflammation and fluid buildup in your spine that may be causing pain  You may be given pain medicine along with the steroids  ACTIVITY  Do not drive or operate machinery today  No strenuous activity today - bending, lifting, etc   You may resume normal activites starting tomorrow - start slowly and as tolerated  You may shower today, but no tub baths or hot tubs  You may have numbness for several hours from the local anesthetic  Please use caution and common sense, especially with weight-bearing activities  CARE OF THE INJECTION SITE  If you have soreness or pain, apply ice to the area today (20 minutes on/20 minutes off)  Starting tomorrow, you may use warm, moist heat or ice if needed  You may have an increase or change in your discomfort for 36-48 hours after your treatment  Apply ice and continue with any pain medication you have been prescribed  Notify the [facility] if you have any of the following: redness, drainage, swelling, headache, stiff neck or fever above 100°F     SPECIAL INSTRUCTIONS  Our office will contact you in approximately 7 days for a progress report  MEDICATIONS  Continue to take all routine medications  Our office may have instructed you to hold some medications  As no general anesthesia was used in today's procedure, you should not experience any side effects related to anesthesia  If you are diabetic, the steroids used in today's injection may temporarily increase your blood sugar levels after the first few days after your injection  Please keep a close eye on your sugars and alert the doctor who manages your diabetes if your sugars are significantly high from your baseline or you are symptomatic       If you have a problem specifically related to your procedure, please call our office at [phone]  Problems not related to your procedure should be directed to your primary care physician

## 2023-01-31 NOTE — H&P
History of Present Illness: The patient is a 41 y o  male who presents with complaints of left leg pain    No past medical history on file  Past Surgical History:   Procedure Laterality Date   • ANKLE FRACTURE SURGERY Left 09/27/2021         Current Outpatient Medications:   •  Diclofenac Sodium (VOLTAREN) 1 %, APPLY 2 GRAMS TO AREA OF PAIN 4 TIMES PER DAY AS NEEDED FOR PAIN, Disp: , Rfl:   •  Diclofenac Sodium 3 % GEL, Apply 1 application topically 4 (four) times a day, Disp: 35 g, Rfl: 11  •  gabapentin (Neurontin) 100 mg capsule, Take 1 capsule (100 mg total) by mouth 3 (three) times a day, Disp: 90 capsule, Rfl: 5  •  meloxicam (Mobic) 15 mg tablet, Take 1 tablet (15 mg total) by mouth daily, Disp: 30 tablet, Rfl: 1  •  Naproxen Sodium 220 MG CAPS, Take 2 tablets by mouth, Disp: , Rfl:   •  nortriptyline (PAMELOR) 10 mg capsule, TAKE BY MOUTH 1 CAPSULE BEFORE BEDTIME  TAKE WITH 25MG DOSE FOR A TOTAL OF 35MG NIGHTLY , Disp: , Rfl:   •  nortriptyline (PAMELOR) 25 mg capsule, Take 25 mg by mouth daily, Disp: , Rfl:   •  OLANZapine (ZyPREXA) 2 5 mg tablet, Take 2 5 mg by mouth daily at bedtime (Patient not taking: No sig reported), Disp: , Rfl:   •  polyethylene glycol (GLYCOLAX) 17 GM/SCOOP powder, Mix 1 packet with 6 oz of water or juice twice weekly for constipation (Patient not taking: No sig reported), Disp: , Rfl:   •  traZODone (DESYREL) 100 mg tablet, TAKE BY MOUTH 1 TABLET BEFORE BEDTIME  FOR SLEEP   (Patient not taking: No sig reported), Disp: , Rfl:   •  traZODone (DESYREL) 100 mg tablet, Take 100 mg by mouth daily, Disp: , Rfl:     Allergies   Allergen Reactions   • Tramadol Nausea Only       Physical Exam:   Vitals:    01/31/23 1407   BP: 146/94   Pulse: 91   Resp: 20   Temp: 97 5 °F (36 4 °C)   SpO2: 98%     General: Awake, Alert, Oriented x 3, Mood and affect appropriate  Respiratory: Respirations even and unlabored  Cardiovascular: Peripheral pulses intact; no edema  Musculoskeletal Exam: normal    ASA Score: 2    Patient/Chart Verification  Patient ID Verified: Verbal  ID Band Applied: No  Consents Confirmed: To be obtained in the Pre-Procedure area  H&P( within 30 days) Verified: To be obtained in the Pre-Procedure area  Interval H&P(within 24 hr) Complete (required for Outpatients and Surgery Admit only): To be obtained in the Pre-Procedure area  Allergies Reviewed: Yes  Anticoag/NSAID held?: NA  Currently on antibiotics?: No  Pregnancy denied?: NA    Assessment:   1   Chronic bilateral low back pain with left-sided sciatica        Plan: left L5 and S1 TFESI

## 2023-04-04 ENCOUNTER — TELEPHONE (OUTPATIENT)
Dept: PAIN MEDICINE | Facility: MEDICAL CENTER | Age: 48
End: 2023-04-04

## 2023-04-04 NOTE — TELEPHONE ENCOUNTER
Caller: patient    Doctor: yuniel    Reason for call: would like to schedule a procedure    Call back#:

## 2023-04-04 NOTE — TELEPHONE ENCOUNTER
Attempted to contact pt  Unable to complete call due to call block  Call made from [facility] office    If pt calls back, please make f/u appointment  Has not been seen in office since Aug 2022  Has only been seen for injections with last one being in [place]

## 2023-04-11 NOTE — TELEPHONE ENCOUNTER
Caller: Katherine Keen    Doctor: Olga Mcintyre     Reason for call: patient called to say he did not want surgery he just wants to have another epidural, he then changed his mind and wants to have the office visit to discuss to make sure he is making the right choice  He wasn't sure on what he wanted   I made him aware of office visit on 5/5    Call back#: [phone]

## 2023-04-28 ENCOUNTER — OFFICE VISIT (OUTPATIENT)
Dept: PAIN MEDICINE | Facility: CLINIC | Age: 48
End: 2023-04-28

## 2023-04-28 VITALS — BODY MASS INDEX: 32.57 KG/M2 | HEIGHT: 69 IN | WEIGHT: 219.9 LBS

## 2023-04-28 DIAGNOSIS — M51.26 LUMBAR DISC HERNIATION: ICD-10-CM

## 2023-04-28 DIAGNOSIS — M54.16 LUMBAR RADICULOPATHY: ICD-10-CM

## 2023-04-28 DIAGNOSIS — G89.4 CHRONIC PAIN SYNDROME: Primary | ICD-10-CM

## 2023-04-28 RX ORDER — PREDNISONE 10 MG/1
30 TABLET ORAL 2 TIMES DAILY WITH MEALS
Qty: 30 TABLET | Refills: 0 | Status: SHIPPED | OUTPATIENT
Start: 2023-04-28 | End: 2023-05-03

## 2023-04-30 NOTE — PATIENT INSTRUCTIONS
Epidural Steroid Injection, Ambulatory Care   GENERAL INFORMATION:   What do I need to know about an epidural steroid injection? An epidural steroid injection (ALISA) is a procedure to inject steroid medicine into the epidural space  The epidural space is between your spinal cord and vertebrae  Steroids reduce inflammation and fluid buildup in your spine that may be causing pain  You may be given pain medicine along with the steroids  How do I prepare for an ALISA? Your healthcare provider will talk to you about how to prepare for your procedure  He will tell you what medicines to take or not take on the day of your procedure  You may need to stop taking blood thinners or other medicines several days before your procedure  You may need to adjust any diabetes medicine you take on the day of your procedure  Steroid medicine can increase your blood sugar level  What will happen during an ALISA? You will be given medicine to numb the procedure area  You will be awake for the procedure, but you will not feel pain  You may also be given medicine to help you relax during the procedure  Contrast liquid will be used to help your healthcare provider see the area better  Tell the healthcare provider if you have ever had an allergic reaction to contrast liquid  Your healthcare provider may place the needle into your neck area, middle of your back, or tailbone area  He may inject the medicine next to the nerves that are causing your pain  He may instead inject the medicine into a larger area of the epidural space  This helps the medicine spread to more nerves  Your healthcare provider will use a fluoroscope to help guide the needle to the right place  A fluoroscope is a type of x-ray  After the procedure, a bandage will be placed over the injection site to prevent infection  What are the risks of an ALISA? You may have temporary or permanent nerve damage or paralysis   You may have bleeding or develop a serious infection, such as meningitis (swelling of the brain coverings)  An abscess may also develop  You may need surgery to fix the abscess  You may have a seizure, anxiety, or trouble sleeping  If you are a man, you may have temporary erectile dysfunction (not able to have an erection)  CARE AGREEMENT:   You have the right to help plan your care  Learn about your health condition and how it may be treated  Discuss treatment options with your caregivers to decide what care you want to receive  You always have the right to refuse treatment  The above information is an  only  It is not intended as medical advice for individual conditions or treatments  Talk to your doctor, nurse or pharmacist before following any medical regimen to see if it is safe and effective for you  © 2014 [address] is for End User's use only and may not be sold, redistributed or otherwise used for commercial purposes  All illustrations and images included in CareNotes® are the copyrighted property of A D A M [employer]  or Paramjit [employer]

## 2023-05-08 ENCOUNTER — TELEPHONE (OUTPATIENT)
Dept: PAIN MEDICINE | Facility: CLINIC | Age: 48
End: 2023-05-08

## 2023-05-08 ENCOUNTER — CONSULT (OUTPATIENT)
Dept: NEUROSURGERY | Facility: CLINIC | Age: 48
End: 2023-05-08

## 2023-05-08 VITALS
WEIGHT: 219 LBS | HEART RATE: 109 BPM | TEMPERATURE: 98.4 F | HEIGHT: 69 IN | OXYGEN SATURATION: 99 % | BODY MASS INDEX: 32.44 KG/M2

## 2023-05-08 DIAGNOSIS — M54.16 LUMBAR RADICULOPATHY: Primary | ICD-10-CM

## 2023-05-08 DIAGNOSIS — M51.26 LUMBAR DISC HERNIATION: ICD-10-CM

## 2023-05-08 RX ORDER — PREDNISONE 10 MG/1
TABLET ORAL
COMMUNITY
Start: 2023-05-05

## 2023-05-08 RX ORDER — OXYCODONE HYDROCHLORIDE 5 MG/1
10 TABLET ORAL EVERY 6 HOURS PRN
Qty: 10 TABLET | Refills: 0 | Status: SHIPPED | OUTPATIENT
Start: 2023-05-08 | End: 2023-05-09 | Stop reason: CLARIF

## 2023-05-08 RX ORDER — FLUOXETINE HYDROCHLORIDE 40 MG/1
CAPSULE ORAL
COMMUNITY
Start: 2023-03-15

## 2023-05-08 NOTE — TELEPHONE ENCOUNTER
Caller: Wali Pang    Doctor: Josh/Preet    Reason for call: patient was referred to [facility] he has no ride and its to [facility], he wants a closer location  I told him to call his insurance find out what providers (neurosurgury) are covered and then narrow it down to location  Then call us back with name and we will put in referral at his request      I also reminded him to call Dr Peyton Medrano office to cancel appt so he won't No show again       Call back#: [phone]

## 2023-05-08 NOTE — PROGRESS NOTES
Neurosurgery Office Note  Jill Reveles 57 y o  male MRN: [id number]      Assessment/Plan     Patient is a 57years-year-old gentleman with insignificant past medical history, S/P  calcaneus ORIF for  fracture after fall 2 yrs ago   & chronic lower back pain with left leg radiculopathy  Patient is referred to NSx service by his PCP  He had  Hx of fall about a year ago at which point, he started experiencing lower back pain that progressed over time  Patient reports  Severe shooting and sometimes spasmodic  pain along the left posterolateral thigh and left outer calf region  Has also associated weakness in his left leg and  difficulty walking  Mild RL symptoms  He reports urinary frequency and urgency at night, also associated numbness in his perineal region when he clean himself  Patient tried PT and also Left L5-S1 ALISA 2x by Dr Ramesh Warren, got some relief for a few weeks  Last injection was on 01/31/2023  He also tried Gabapentin, NSAIDs, muscle relaxer and oral prednisolone  Exam-A&Ox3  Saul  Strength UEs limited by arthritis in his hands, otherwise has good   Left LE strength Hip flexor 4-/5, KE/KF=4-/5, Foot DF=4/5 ( difficulty sustaining Flexed Ankle), PF 4/5, sensation to LT decreased on the left outer calf and posterolateral left thigh  Mild edema in the left  lateral ankle, healed left lateral ankle surgical wound  Tenderness in the Lower Lumbar spine at LS region , more to the left paraspinal at this level  MRI of Lumbar spine wo contrast on 9/26/2023 demonstrates left paracentral disc protrusion at L5-S1 level contacting the left S1 nerve root without obvious nerve compression  Hx, PEx, and images reviewed with the patient  Mx plan discussed  I think patient 's symptoms consistent L5-S1 radiculopathy and it could have progressed  His MRI is more than 6 months old   I would recommend up-todates MRI of Lumbar spine without contrast to evaluate DJD of his Lumbar spine and the extent of nerve "compression  In the meantime, continue follow up with pain Mx, script for narcotics ( 10 pills Roxicodone) sent to his pharmacy  Continue walking exercise, fall precaution, avoid lifting heavy objects, excessive bending or twisting  All questions and concerns were answered to patient's satisfaction  Patient expressed his understandings and agreed with the plan  Plan:   1  MRI of lumbar spine without contrast  2  Patient was seen in pain, Gave him script for 10 pills Roxicodone  3  Fall precaution, avoid lifting heavy objects, excessive bending or twisting  4  Follow-up after image results, Shared Visit, Dr Destiney Mccann  5  Call with question or concern    I have spent a total time of 45 minutes on 05/08/23 in caring for this patient including Diagnostic results, Prognosis, Risks and benefits of tx options, Instructions for management, Patient and family education, Importance of tx compliance, Risk factor reductions, Impressions, Counseling / Coordination of care, Documenting in the medical record, Reviewing / ordering tests, medicine, procedures   and Obtaining or reviewing history    C/C: \" Referred by his PCP for evaluation of left leg Lumbar radiculopathy\"      HPI    Patient is a 57 yrs old gentleman with insignificant PMHx, s/p left Calcaneous ORIF, referred by his PCP for evaluation of progressive Lower back pain with Left leg radiculopathy  Patient  Reports severe shooting and  spasmodic pain that radiates down to his left postero-loateral thigh and down to his outer calf to the lateral left ankle  He says pain is worse with movement, weakness,  difficulty walking, limping gait  Had associated numbness and paresthesia in his left posterolateral thigh and left outer calf region  Patient also reports urinary  Frequency and urgency, with perineal anesthesia when he cleaned himself  No Bowel issues   Patient tried PT AND pain Mx, had 2 left L5-s1 TF ALISA, got relief for few weeks after the injections, but his " pain came back  He also tried Gabapentin, applied  3% Diclofenac GEL  Currently on NSAIDs, Oral steroid but without much change  His left leg pain and weakness getting worse  Patient denies Hx of fever chills, rigors, cough or chest pain  He denies Hx of DM, CHF, HTN, Stroke, seizures, bleeding disorder or taking anticoagulant meds  He smoke 1 Pack of cigarettes for a week  Drinks alcohol socially  Imaging findings as described in the assessment section above  REVIEW OF SYSTEMS  Review of system reviewed personally and updated  Review of Systems   Constitutional: Negative  HENT: Negative  Eyes: Negative  Respiratory: Negative  Cardiovascular: Negative  Gastrointestinal: Negative  Endocrine: Negative  Genitourinary: Negative  Musculoskeletal: Positive for back pain  Mid to Lbp radiates to left leg x 4wks  + N/W on left leg  His pain is constant, sharp, shooting pain and he rates it a 9/10 pain today  His pain is aggravated by standing/walking (<15 min)   Meds: OTC, Meloxicam, Gabapentin  PT @ [facility]  x 2MO  provided minimal relief,   ALISA in Feb 2022 @ [facility] provided mild relief  L/S MRI done 9/26/22   No AC/ Former Smoker  No previous back surgery   Skin: Negative  Allergic/Immunologic: Negative  Neurological: Negative  Hematological: Negative  Psychiatric/Behavioral: Negative  All other systems reviewed and are negative  ROS obtained by MA  Reviewed  See HPI       Meds/Allergies     Current Outpatient Medications   Medication Sig Dispense Refill   • Diclofenac Sodium (VOLTAREN) 1 % APPLY 2 GRAMS TO AREA OF PAIN 4 TIMES PER DAY AS NEEDED FOR PAIN     • Diclofenac Sodium 3 % GEL Apply 1 application topically 4 (four) times a day 35 g 11   • gabapentin (Neurontin) 100 mg capsule Take 1 capsule (100 mg total) by mouth 3 (three) times a day 90 capsule 5   • meloxicam (Mobic) 15 mg tablet Take 1 tablet (15 mg total) by mouth daily 30 tablet 1   • Naproxen Sodium 220 MG CAPS Take 2 tablets by mouth     • nortriptyline (PAMELOR) 10 mg capsule TAKE BY MOUTH 1 CAPSULE BEFORE BEDTIME  TAKE WITH 25MG DOSE FOR A TOTAL OF 35MG NIGHTLY  • nortriptyline (PAMELOR) 25 mg capsule Take 25 mg by mouth daily     • OLANZapine (ZyPREXA) 2 5 mg tablet Take 2 5 mg by mouth daily at bedtime (Patient not taking: Reported on 8/24/2022)     • polyethylene glycol (GLYCOLAX) 17 GM/SCOOP powder Mix 1 packet with 6 oz of water or juice twice weekly for constipation (Patient not taking: Reported on 8/24/2022)     • traZODone (DESYREL) 100 mg tablet TAKE BY MOUTH 1 TABLET BEFORE BEDTIME  FOR SLEEP  (Patient not taking: Reported on 8/24/2022)     • traZODone (DESYREL) 100 mg tablet Take 100 mg by mouth daily       No current facility-administered medications for this visit  Allergies   Allergen Reactions   • Tramadol Nausea Only       PAST HISTORY    No past medical history on file  Past Surgical History:   Procedure Laterality Date   • ANKLE FRACTURE SURGERY Left 09/27/2021       Social History     Tobacco Use   • Smoking status: Former   • Smokeless tobacco: Never   Vaping Use   • Vaping Use: Never used   Substance Use Topics   • Alcohol use: Yes     Comment: occassional   • Drug use: Never       No family history on file  Above history personally reviewed  EXAM    Vitals: There were no vitals taken for this visit  ,There is no height or weight on file to calculate BMI  Physical Exam  Constitutional:       Appearance: He is obese  HENT:      Head: Normocephalic and atraumatic  Eyes:      Extraocular Movements: Extraocular movements intact  Cardiovascular:      Rate and Rhythm: Normal rate  Pulses: Normal pulses  Pulmonary:      Effort: Pulmonary effort is normal    Musculoskeletal:         General: Tenderness present  Cervical back: Normal range of motion  Neurological:      Mental Status: He is alert  GCS: GCS eye subscore is 4  GCS verbal subscore is 5   GCS motor subscore is 6  Cranial Nerves: Cranial nerves 2-12 are intact  Sensory: Sensory deficit present  Motor: Weakness present  Gait: Gait abnormal       Deep Tendon Reflexes: Reflexes are normal and symmetric  Reflex Scores:       Tricep reflexes are 2+ on the right side and 2+ on the left side  Bicep reflexes are 2+ on the right side and 2+ on the left side  Brachioradialis reflexes are 2+ on the right side and 2+ on the left side  Patellar reflexes are 2+ on the right side and 2+ on the left side  Achilles reflexes are 2+ on the right side and 2+ on the left side  Psychiatric:         Speech: Speech normal          Neurologic Exam     Mental Status   Speech: speech is normal     Cranial Nerves   Cranial nerves II through XII intact  CN III, IV, VI   Nystagmus: none     CN XI   CN XI normal      Motor Exam   Muscle bulk: normal  Overall muscle tone: normal  Right arm tone: normal  Left arm tone: normal  Right arm pronator drift: absent  Left arm pronator drift: absent  Right leg tone: normal  Left leg tone: normal    Strength   Right quadriceps: 5/5  Left quadriceps: 4/5    Sensory Exam   Right arm light touch: normal  Left arm light touch: normal  Right leg light touch: normal  Left leg light touch: decreased from knee    Gait, Coordination, and Reflexes     Reflexes   Right brachioradialis: 2+  Left brachioradialis: 2+  Right biceps: 2+  Left biceps: 2+  Right triceps: 2+  Left triceps: 2+  Right patellar: 2+  Left patellar: 2+  Right achilles: 2+  Left achilles: 2+  Right : 2+  Left : 2+  Right Villeda: absent  Left Villeda: absent  Right ankle clonus: absent  Left pendular knee jerk: absent        MEDICAL DECISION MAKING    Imaging Studies:     No results found      I have personally reviewed pertinent reports   , I have personally reviewed pertinent films in PACS and I have personally reviewed pertinent films in PACS with a Radiologist

## 2023-05-09 DIAGNOSIS — M51.26 LUMBAR DISC HERNIATION: ICD-10-CM

## 2023-05-09 DIAGNOSIS — M54.16 LUMBAR RADICULOPATHY: ICD-10-CM

## 2023-05-09 RX ORDER — OXYCODONE HYDROCHLORIDE 5 MG/1
5 TABLET ORAL EVERY 6 HOURS PRN
Qty: 10 TABLET | Refills: 0 | Status: SHIPPED | OUTPATIENT
Start: 2023-05-09

## 2023-05-09 NOTE — TELEPHONE ENCOUNTER
Called [facility] and they confirmed that he did not  the narcotic script  Will cancel on their end  We can cancel that script and place a new one to go to the correct pharmacy  I will route it to the SUDARSHAN Gan to sign at his earliest convenience

## 2023-05-09 NOTE — TELEPHONE ENCOUNTER
Called [facility] pharmacy on [address]  in [place] to confirm he  Didn't  the script that was sent yesterday   They are currently on lunch until 2pm, so I will call back after 2pm

## 2023-05-16 DIAGNOSIS — M51.26 LUMBAR DISC HERNIATION: ICD-10-CM

## 2023-05-16 DIAGNOSIS — M54.16 LUMBAR RADICULOPATHY: ICD-10-CM

## 2023-05-16 RX ORDER — OXYCODONE HYDROCHLORIDE 5 MG/1
5 TABLET ORAL EVERY 6 HOURS PRN
Qty: 10 TABLET | Refills: 0 | Status: CANCELLED | OUTPATIENT
Start: 2023-05-16

## 2023-05-17 DIAGNOSIS — M51.26 LUMBAR DISC HERNIATION: ICD-10-CM

## 2023-05-17 DIAGNOSIS — M54.16 LUMBAR RADICULOPATHY: ICD-10-CM

## 2023-05-17 RX ORDER — OXYCODONE HYDROCHLORIDE 5 MG/1
5 TABLET ORAL EVERY 6 HOURS PRN
Qty: 10 TABLET | Refills: 0 | Status: CANCELLED | OUTPATIENT
Start: 2023-05-17

## 2023-05-17 NOTE — TELEPHONE ENCOUNTER
Received a call from patient requesting a refill of his oxycodone  Returned call to patient and advised unfortunately our office typically only prescribes for 6 weeks post surgery  Advised BARB MONTENEGRO likely gave him a small script until he can be seen by his PCP or PM      Patient was adamant requesting 1 last refill until he can be seen by his PCP  Advised this RN will route the refill to [facility]  Patient was very appreciative

## 2023-05-17 NOTE — TELEPHONE ENCOUNTER
Aileen Diaz PA-C  to Me  2:04 PM  Patient didn't have surgery,  he was in severe pain last time when I saw him in office & gave him some   We don't continuously order  pain meds  Recommend follow up with his pain Mx office or call his FP  Thank you! Crispin Ramirez         Reached out to patient and informed him of the above  Patient expressed frustration

## 2023-05-22 ENCOUNTER — TELEPHONE (OUTPATIENT)
Dept: PAIN MEDICINE | Facility: MEDICAL CENTER | Age: 48
End: 2023-05-22

## 2023-05-22 NOTE — TELEPHONE ENCOUNTER
Unable to initiate any opioid medications:    Patient currently taking gabapentin, therefore I cannot prescribe Lyrica,  Patient is on prozac so I am unable to prescribe amitriptyline or duloxetine  Patient prescribed meloxicam therefore I cannot prescribe diclofenac, naproxen, etodolac  I recently prescribed him a high dose prednisone taper which the patient states is ineffective  I can trial him on topiramate however this medication is not a as needed medication and needs to be taking consistently like gabapentin in order to receive any pain relief  I do not believe this is what the patient is looking for  The patient may follow-up with his primary care

## 2023-05-22 NOTE — TELEPHONE ENCOUNTER
S/w pt who received oxycodone from neurosurgery at ov  Advised per last ov note with SPA, opioids not part of plan of care  Per neurosx note, pt is to have MRI and f/u to discuss possible surgery  Pt states MRI is not til 6/6  Pt asking what he can take until then  Pt states steroid does not help and has tried and failed otc meds and injections

## 2023-05-23 ENCOUNTER — TELEPHONE (OUTPATIENT)
Age: 48
End: 2023-05-23

## 2023-05-23 NOTE — TELEPHONE ENCOUNTER
Neurosurgery prescribed oxyCODONE  Enough for 10 days, patient called them to get refill and they said he must get from PCP, he is getting MRI 6/6--possibly may be having surgery soon on back    Will you refill pills for patient?

## 2023-05-23 NOTE — TELEPHONE ENCOUNTER
"FYI:  S/W pt and advised the same  States he has \"tried every other medication and nothing else works  \"  States \"Oxycodone is the only thing that has worked and NS only gave me 10 pills  \"  Santiago Aranda again that Opioids were not in his treatment plan, our office did not start him on the Oxycodone and to f/u with NS or PCP  Pt expressed that he did not understand this and wanted NP to \"Look at my chart and see how many meds I have tried and nothing has worked  \"  States \"I am not abusing medications, they should really give it to the people who need it  \"  Pt is not interested in trying a medication that he knows will not work  Advised again that Oxy or Opioids would not be Rx'd  "

## 2023-06-06 ENCOUNTER — HOSPITAL ENCOUNTER (OUTPATIENT)
Dept: MRI IMAGING | Facility: CLINIC | Age: 48
Discharge: HOME | End: 2023-06-06
Payer: COMMERCIAL

## 2023-06-06 DIAGNOSIS — M51.26 LUMBAR DISC HERNIATION: ICD-10-CM

## 2023-06-06 DIAGNOSIS — M54.16 LUMBAR RADICULOPATHY: ICD-10-CM

## 2023-06-06 PROCEDURE — 72148 MRI LUMBAR SPINE W/O DYE: CPT

## 2023-06-06 PROCEDURE — G1004 CDSM NDSC: HCPCS

## 2023-06-12 ENCOUNTER — TELEPHONE (OUTPATIENT)
Dept: NEUROSURGERY | Facility: CLINIC | Age: 48
End: 2023-06-12

## 2023-07-10 ENCOUNTER — TELEPHONE (OUTPATIENT)
Dept: NEUROSURGERY | Facility: CLINIC | Age: 48
End: 2023-07-10

## 2023-07-10 ENCOUNTER — TELEPHONE (OUTPATIENT)
Age: 48
End: 2023-07-10

## 2023-07-10 NOTE — TELEPHONE ENCOUNTER
Pt is requesting lab orders for his PO for drug screening. Call pt when done. [phone]. He will contact the lab and get a fax number for us to fax it to.

## 2023-07-10 NOTE — TELEPHONE ENCOUNTER
7/10/23 - PT CALLED REQ A VIRTUAL APPT FOR WED 7/12/23 W/LYNDA Camargo KIP - FU MRI LSPINE 6/6/23    HE IS STAYING WITH HIS DTR IN [place] TO ASSIST HIM & HE WILL NOT BE ABLE TO TRAVEL    MESSAGE SENT TO [facility]  FOR FU

## 2023-07-11 DIAGNOSIS — Z00.00 HEALTHCARE MAINTENANCE: Primary | ICD-10-CM

## 2023-07-11 NOTE — TELEPHONE ENCOUNTER
Drug test  Would be for cocaine needs lab slip as he is in [place] and needs to go to lab to have this done for

## 2023-07-11 NOTE — TELEPHONE ENCOUNTER
supposed to get an electronic referral for blood testing.   Return call from yesterday    [phone]

## 2023-07-12 ENCOUNTER — TELEPHONE (OUTPATIENT)
Dept: NEUROSURGERY | Facility: CLINIC | Age: 48
End: 2023-07-12

## 2023-07-14 ENCOUNTER — TELEMEDICINE (OUTPATIENT)
Dept: NEUROSURGERY | Facility: CLINIC | Age: 48
End: 2023-07-14
Payer: COMMERCIAL

## 2023-07-14 ENCOUNTER — TELEPHONE (OUTPATIENT)
Dept: NEUROSURGERY | Facility: CLINIC | Age: 48
End: 2023-07-14

## 2023-07-14 ENCOUNTER — TELEPHONE (OUTPATIENT)
Age: 48
End: 2023-07-14

## 2023-07-14 DIAGNOSIS — M54.50 LOWER BACK PAIN: Primary | ICD-10-CM

## 2023-07-14 DIAGNOSIS — M54.16 LUMBAR RADICULOPATHY: Primary | ICD-10-CM

## 2023-07-14 PROCEDURE — 99212 OFFICE O/P EST SF 10 MIN: CPT | Performed by: PHYSICIAN ASSISTANT

## 2023-07-14 NOTE — TELEPHONE ENCOUNTER
Re-Faxed to [employer]; pt requested at the lab in [facility] from Dr Emile Baker for Drug Screen  F#: [phone] · Will place on telemetry  · Trend cardiac enzymes  · Obtain serial EKGs  · Obtain orthostatic vital signs Q shift

## 2023-07-14 NOTE — PROGRESS NOTES
Virtual Regular Visit  It was my intent to perform this visit via video technology but the patient was not able to do a video connection so the visit was completed via audio telephone only. Verification of patient location:  Patient is located at Home in the following state in which I hold an active license [place]      Assessment:     Patient is a 57years old gentleman with lower back pain and left leg radiculopathy started about a year ago after he fell down. Had MRI of lumbar spine and he is here today for telephone virtual visit to go over the results of his images. MRI of lumbar spine reported normal L1-L4 and there is a deep disc bulge at L4-5 with no significant central canal stenosis or neural foraminal narrowing. At L5-S1 there is left neural foraminal disc protrusion mild left neural foraminal narrowing and central canal and right neuroforaminal patent. Similar to previous study. Patient reports this continues lower back pain with left leg radiculopathy associated with numbness and paresthesia in the posterolateral thigh outer calf into the foot, weakness in the leg and difficulty walking. No bowel/bladder issues. Patient tried 2 left L5-S1 transforaminal ALISA by Dr. Beryle Knee in November 2022 and January 2023 . He also tried different oral pain medications including meloxicam, gabapentin, oxycodone, and prednisone without much benefit. Exam-Limited Telephone virtual visit    Hx, and images findings reviewed . Mx plan discussed. Dr Zach Clark reviewed patient's lumbar MRI and found no strong indication for any lumbar procedure. I called the patient and discussed management plan, continue follow-up with pain management for possible repeat ALISA, in the middle branch block and  possible spinal cord stimulation trial.  Patient understands the instructions and agreed with the plan. Plan:  1. Lumbar MRI Images shows Mild DJD disease with disk bulges lower Lumbar spine  2.  Patient tried 3 ALISA and different pain meds but with out relief-Dr Sue Leach reviewed the images & recommends no surgical intervention  3. No neurosurgical intervention, follow-up with PMx for repeat ALISA and SCS trial eval  4. Otherwise follow up on PRN basis  5. Call with questions or concerns     Problem List Items Addressed This Visit    None           Reason for visit is   Chief Complaint   Patient presents with   • Follow-up   • Virtual Regular Visit        Encounter provider Liberty Rosado PA-C    Provider located at [address] [place] [address]. [place] [id number]      Recent Visits  Date Type Provider Dept   07/12/23 Telephone Kalyani Nichole [facility] recent visits within past 7 days and meeting all other requirements  Today's Visits  Date Type Provider Dept   07/14/23 Telemedicine Liberty Rosado PA-C [facility] [place] [facility]   Showing today's visits and meeting all other requirements  Future Appointments  No visits were found meeting these conditions. Showing future appointments within next 150 days and meeting all other requirements       The patient was identified by name and date of birth. Soniya Wilson was informed that this is a telemedicine visit and that the visit is being conducted through Telephone. My office door was closed. No one else was in the room. He acknowledged consent and understanding of privacy and security of the video platform. The patient has agreed to participate and understands they can discontinue the visit at any time. Patient is aware this is a billable service. Subjective    C/C: Here to go over the results of his Lumbar spine MRI"      HPI     All patient's medical histories were reviewed and updated as appropriate: Allergies, current medication list, past medical history, past surgical history, family history, social history, and current medical lists.     Patient with chronic lower back pain that started about a year ago following a fall. Had associated left lower extremity radiculopathy, numbness, paresthesia, weakness and difficulty walking. Here today for telephone virtual visit to go over the results of his lumbar spine MRI. Image findings as described in the assessment section above. Patient tried multiple injections but without much improvement. He also tried different pain medications without much long term effect. Patient denies any urinary issues. He is following up with pain management Dr. Joellen Mcgowan. I discussed with Dr. Pete Livingston, who reviewed the image and there is no indication for any lumbar surgical procedure. Past Surgical History:   Procedure Laterality Date   • ANKLE FRACTURE SURGERY Left 09/27/2021       Current Outpatient Medications   Medication Sig Dispense Refill   • Diclofenac Sodium (VOLTAREN) 1 % APPLY 2 GRAMS TO AREA OF PAIN 4 TIMES PER DAY AS NEEDED FOR PAIN     • Diclofenac Sodium 3 % GEL Apply 1 application topically 4 (four) times a day 35 g 11   • FLUoxetine (PROzac) 40 MG capsule TAKE 1 CAPSULE BY MOUTH IN THE MORNING. TAKE WITH 20MG CAPSULE FOR TOTAL OF 60MG DAILY. • gabapentin (Neurontin) 100 mg capsule Take 1 capsule (100 mg total) by mouth 3 (three) times a day 90 capsule 5   • meloxicam (Mobic) 15 mg tablet Take 1 tablet (15 mg total) by mouth daily 30 tablet 1   • Naproxen Sodium 220 MG CAPS Take 2 tablets by mouth     • nortriptyline (PAMELOR) 10 mg capsule TAKE BY MOUTH 1 CAPSULE BEFORE BEDTIME.  TAKE WITH 25MG DOSE FOR A TOTAL OF 35MG NIGHTLY. (Patient not taking: Reported on 5/8/2023)     • nortriptyline (PAMELOR) 25 mg capsule Take 25 mg by mouth daily (Patient not taking: Reported on 5/8/2023)     • OLANZapine (ZyPREXA) 2.5 mg tablet Take 2.5 mg by mouth daily at bedtime (Patient not taking: Reported on 8/24/2022)     • oxyCODONE (Roxicodone) 5 immediate release tablet Take 1 tablet (5 mg total) by mouth every 6 (six) hours as needed for moderate pain Max Daily Amount: 20 mg 10 tablet 0   • polyethylene glycol (GLYCOLAX) 17 GM/SCOOP powder Mix 1 packet with 6 oz of water or juice twice weekly for constipation (Patient not taking: Reported on 8/24/2022)     • predniSONE 10 mg tablet PLEASE SEE ATTACHED FOR DETAILED DIRECTIONS     • traZODone (DESYREL) 100 mg tablet TAKE BY MOUTH 1 TABLET BEFORE BEDTIME. FOR SLEEP. (Patient not taking: Reported on 8/24/2022)     • traZODone (DESYREL) 100 mg tablet Take 100 mg by mouth daily       No current facility-administered medications for this visit. Allergies   Allergen Reactions   • Tramadol Nausea Only       Review of Systems   Constitutional: Negative. HENT: Negative. Eyes: Negative. Respiratory: Negative. Cardiovascular: Negative. Gastrointestinal: Negative. Endocrine: Negative. Genitourinary: Negative. Musculoskeletal: Positive for back pain. Mid to Lbp radiates to left  posterior leg x 4wks. + N/W on left leg. His pain is constant, sharp, shooting pain and he rates it a 9/10 pain today. His pain is aggravated by standing/walking (<15 min) . H/o Fall  down steps in Feb 2022  Meds: OTC, Meloxicam, Gabapentin-- No Relief  PT @ [facility]  x 2MO. provided minimal relief,   ALISA in Feb 2022 @ [facility] provided mild relief. L/S MRI done 9/26/22   No AC/ Former Smoker  No previous back surgery   Skin: Negative. Allergic/Immunologic: Negative. Neurological: Positive for weakness and numbness. Hematological: Negative. Psychiatric/Behavioral: Positive for sleep disturbance (due to pain). All other systems reviewed and are negative. Video Exam    There were no vitals filed for this visit. Physical Exam     Visit Time  Total Visit Duration: 20 mins, discussing history, image results and management plan including coordination of care.

## 2023-07-14 NOTE — TELEPHONE ENCOUNTER
Received a call from [facility] stating he was awaiting a call back from [facility] regarding referral to another provider? Noted he had telemed visit today today but did not see mention of additional referral. Will communicate with [facility] and assist as needed.

## 2023-07-18 ENCOUNTER — TELEPHONE (OUTPATIENT)
Dept: PAIN MEDICINE | Facility: MEDICAL CENTER | Age: 48
End: 2023-07-18

## 2023-07-18 LAB
AMPHETAMINES SERPL QL SCN: NEGATIVE NG/ML
BARBITURATES SERPL QL SCN: NEGATIVE UG/ML
BENZODIAZ SERPL QL SCN: NEGATIVE NG/ML
CANNABINOIDS SERPL QL SCN: NEGATIVE NG/ML
COCAINE+BZE SERPL QL SCN: NEGATIVE NG/ML
METHADONE SERPL QL SCN: NEGATIVE NG/ML
OPIATES SERPL QL SCN: NEGATIVE NG/ML
OXYCODONE+OXYMORPHONE SERPLBLD QL SCN: NEGATIVE NG/ML
PCP SERPL QL SCN: NEGATIVE NG/ML
PROPOXYPH SERPL QL SCN: NEGATIVE NG/ML

## 2023-07-18 NOTE — TELEPHONE ENCOUNTER
Caller: Kasi More     Doctor: Dr Singh German for call: Patient calling stating per Dr Hollis Million would like patient to try SCS [place] please advise. Patient also stating pain level 8/10.      Call back#: [phone]

## 2023-07-20 NOTE — TELEPHONE ENCOUNTER
Spoke to the patient and told him that Dr Dana Downey would like him to come in to discuss SCS. Patient was argumentive  Sating he can not do the ride.  I spoke with [place] and transferred the call, she said she will give him the same answer that I gave him

## 2023-07-20 NOTE — TELEPHONE ENCOUNTER
Per SP prior note, he will not do virtual visits  For this particular visit, the patient will need to sign consents for SCS to move forward with the trial    Please schedule OV

## 2023-07-21 ENCOUNTER — TELEPHONE (OUTPATIENT)
Dept: RADIOLOGY | Facility: CLINIC | Age: 48
End: 2023-07-21

## 2023-07-21 NOTE — TELEPHONE ENCOUNTER
----- Message from Darcie Oseguera MD sent at 7/20/2023  5:01 PM EDT -----  Please let patient know that this is a visit to discuss the process of SCS. He would not be coming in to sign consent until he has gone through a work up include MRI of thoracic spine and psych evaluation. 2 hours is a long drive and he will need to make this trip multiple times (for consent and for lead pull) and if he has an issue with the stimulator during the trial he will be too far away. He should see if there are pain management doctors closer to him. Perhaps we can help him with this. Patient needs to know all this before coming because he will definitely be angry.

## 2023-07-24 NOTE — TELEPHONE ENCOUNTER
MAGALIE  Pt has appt today. Attempted to reach pt on Friday and again today multiple times. Phone does not ring and states "Person you are calling can not accept calls at this time." I have tried from multiple phones.  There is not alternate contact number

## 2023-08-02 ENCOUNTER — TELEPHONE (OUTPATIENT)
Age: 48
End: 2023-08-02

## 2023-08-02 NOTE — TELEPHONE ENCOUNTER
Patient returned Ethel's call    Doesn't understand why he can't get Lexapro. He is in [place] - his daughter is taking care of him due to extreme back pain. - can't sit in a car for 1 1/2 hours to get here. Rec he find a PCP closer.   -Patient states [place] state assistance and since he's in [place] cant see anyone.     States his mental health is really suffering.    - Please advise: - Return patients call to explain    [phone]

## 2023-08-02 NOTE — TELEPHONE ENCOUNTER
Left detailed messahe on voice mail Patient has not been seen since 10/2022. We have never rx;d lexapro and will not without a visit . Patient was also told that dr Emile Baker is retiring.

## 2023-08-02 NOTE — TELEPHONE ENCOUNTER
Patient wants Lexapro can't come in due to spine surgery in [place]. Last appt 10/22 [facility].  Please call

## 2023-08-14 ENCOUNTER — DOCUMENTATION (OUTPATIENT)
Dept: PAIN MEDICINE | Facility: CLINIC | Age: 48
End: 2023-08-14

## 2023-08-14 NOTE — PROGRESS NOTES
SP was unable to connect with pt for appt. SP not sure if link was sent to pt or pt was not available for VV. SP suggest that pt come into office to discuss SCS trail or find a provider closer to pt and we can try and assist pt.

## 2023-08-15 ENCOUNTER — TELEPHONE (OUTPATIENT)
Dept: RADIOLOGY | Facility: CLINIC | Age: 48
End: 2023-08-15

## 2023-08-15 NOTE — TELEPHONE ENCOUNTER
----- Message from Joyce Moran MD sent at 8/14/2023 11:38 AM EDT -----  Nova Walters to access [facility] through IM5 and patient not in meeting room. Unclear if he was invited. I do not think virtual visit is a suitable option here. Clinical- if you speak to patient please advise that future visits are to be in person. He again will have to come here about 3-4 times if he is going to get a SCS trial. He may be better off finding a physician that is closer to him. We can assist with this if he needs.

## 2023-09-25 ENCOUNTER — TELEPHONE (OUTPATIENT)
Age: 48
End: 2023-09-25

## 2023-09-25 NOTE — TELEPHONE ENCOUNTER
Rescheduled Patient for 10/06 at 10:45 w/ Dr Travon Xie for a VV Pt didn't answer so LMOM to let him know.

## 2023-09-25 NOTE — TELEPHONE ENCOUNTER
Caller: Cheri Severance    Doctor: Amberly Carey    Reason for call: Pt called in stating he never received a call for his Virtual appt with  and as he confirmed his number it wasn't the same we had on file so I was able to update it and Pt would like o know when he can have his next appt or if Dr Amberly Carey has availability to call him back today.        Please advise      Call back#: [phone]

## 2023-10-05 ENCOUNTER — TELEPHONE (OUTPATIENT)
Age: 48
End: 2023-10-05

## 2023-10-05 NOTE — TELEPHONE ENCOUNTER
Patient called from pharm   requested a refill of medication prescribed by ER in [facility]. (valium, abiligy, naproxen Sodium)     Advised patient that since Dr. Sheeba Nevarez has retired and he did not prescribe -  , recommend establishing with a new PCP - but before I could advise I would send a message back to see what we could possibly do, patient annoyed - hung up.

## 2023-10-05 NOTE — PROGRESS NOTES
Virtual Regular Visit    Verification of patient location:    Patient is located at Home . Assessment/Plan:    Problem List Items Addressed This Visit    None  Visit Diagnoses     Lumbar radiculopathy    -  Primary    Relevant Orders    EMG 1 Limb        Patient symptoms were reviewed. He continues to have low back pain with radiation down the left lower extremity and will closely resembles L5 dermatome. Imaging of the lumbar spine is unremarkable for any significant compressive pathology. However given his specific radicular type pain, will order EMG left lower extremity. If notable chronic radiculopathy, may be candidate for spinal cord stimulator trial which was discussed with the patient today. May also consider alternative approach to epidural prior to proceeding with SCS       Reason for visit is BACK AND LEFT LEG PAIN     Encounter provider Phillip Boxer, MD    Provider located at [address] [place] [id number]      Recent Visits  Date Type Provider Dept   10/05/23 Telephone Leora Martinez MD [facility]   Showing recent visits within past 7 days and meeting all other requirements  Today's Visits  Date Type Provider Dept   10/06/23 Telemedicine Phillip Boxer, MD [facility]   Showing today's visits and meeting all other requirements  Future Appointments  No visits were found meeting these conditions. Showing future appointments within next 150 days and meeting all other requirements       The patient was identified by name and date of birth. Ansley Hall was informed that this is a telemedicine visit and that the visit is being conducted through the [address] [place] [address] [employer] platform. He agrees to proceed. .  My office door was closed. No one else was in the room. He acknowledged consent and understanding of privacy and security of the video platform.  The patient has agreed to participate and understands they can discontinue the visit at any time. Patient is aware this is a billable service. Subjective  Petey Bassett is a 57 y.o. male was seen via virtual visit for ongoing low back pain with radiation down left lower extremity. He has undergone epidural steroid ejections x2 with no significant relief. He is interested in a spinal cord stimulator trial.      HPI     No past medical history on file. Past Surgical History:   Procedure Laterality Date   • ANKLE FRACTURE SURGERY Left 09/27/2021       Current Outpatient Medications   Medication Sig Dispense Refill   • Diclofenac Sodium (VOLTAREN) 1 % APPLY 2 GRAMS TO AREA OF PAIN 4 TIMES PER DAY AS NEEDED FOR PAIN     • Diclofenac Sodium 3 % GEL Apply 1 application topically 4 (four) times a day 35 g 11   • FLUoxetine (PROzac) 40 MG capsule TAKE 1 CAPSULE BY MOUTH IN THE MORNING. TAKE WITH 20MG CAPSULE FOR TOTAL OF 60MG DAILY. • gabapentin (Neurontin) 100 mg capsule Take 1 capsule (100 mg total) by mouth 3 (three) times a day 90 capsule 5   • meloxicam (Mobic) 15 mg tablet Take 1 tablet (15 mg total) by mouth daily 30 tablet 1   • Naproxen Sodium 220 MG CAPS Take 2 tablets by mouth     • nortriptyline (PAMELOR) 10 mg capsule TAKE BY MOUTH 1 CAPSULE BEFORE BEDTIME.  TAKE WITH 25MG DOSE FOR A TOTAL OF 35MG NIGHTLY. (Patient not taking: Reported on 5/8/2023)     • nortriptyline (PAMELOR) 25 mg capsule Take 25 mg by mouth daily (Patient not taking: Reported on 5/8/2023)     • OLANZapine (ZyPREXA) 2.5 mg tablet Take 2.5 mg by mouth daily at bedtime (Patient not taking: Reported on 8/24/2022)     • oxyCODONE (Roxicodone) 5 immediate release tablet Take 1 tablet (5 mg total) by mouth every 6 (six) hours as needed for moderate pain Max Daily Amount: 20 mg 10 tablet 0   • polyethylene glycol (GLYCOLAX) 17 GM/SCOOP powder Mix 1 packet with 6 oz of water or juice twice weekly for constipation (Patient not taking: Reported on 8/24/2022) • predniSONE 10 mg tablet PLEASE SEE ATTACHED FOR DETAILED DIRECTIONS     • traZODone (DESYREL) 100 mg tablet TAKE BY MOUTH 1 TABLET BEFORE BEDTIME. FOR SLEEP. (Patient not taking: Reported on 8/24/2022)     • traZODone (DESYREL) 100 mg tablet Take 100 mg by mouth daily       No current facility-administered medications for this visit. Allergies   Allergen Reactions   • Tramadol Nausea Only       Review of Systems    Video Exam    There were no vitals filed for this visit.     Physical Exam   Patient appears stable and is communicative on exam.    Visit Time  Total Visit Duration: 15min

## 2023-10-06 ENCOUNTER — TELEMEDICINE (OUTPATIENT)
Dept: PAIN MEDICINE | Facility: CLINIC | Age: 48
End: 2023-10-06
Payer: COMMERCIAL

## 2023-10-06 DIAGNOSIS — M54.16 LUMBAR RADICULOPATHY: Primary | ICD-10-CM

## 2023-10-06 PROCEDURE — 99214 OFFICE O/P EST MOD 30 MIN: CPT | Performed by: STUDENT IN AN ORGANIZED HEALTH CARE EDUCATION/TRAINING PROGRAM

## 2023-10-06 NOTE — TELEPHONE ENCOUNTER
Caller: Katie Watt    Doctor: Agustín Ordaz    Reason for call: pt has a 10:45 Virtual appt and hasn't received a call or link.     Please advise    Call back#: [phone]

## 2024-02-14 ENCOUNTER — DOCUMENTATION (OUTPATIENT)
Dept: PAIN MEDICINE | Facility: CLINIC | Age: 49
End: 2024-02-14